# Patient Record
Sex: MALE | Race: WHITE
[De-identification: names, ages, dates, MRNs, and addresses within clinical notes are randomized per-mention and may not be internally consistent; named-entity substitution may affect disease eponyms.]

---
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
## 2018-10-25 NOTE — GHP
DATE OF ADMISSION:  10/25/2018



SOURCE:  Patient is in acute respiratory distress, unable to provide significant amount of history.  
Majority obtained from discussion with ED provider and review of EMR.



CHIEF COMPLAINT:  Shortness of breath.



HISTORY OF PRESENT ILLNESS:  This is a 58-year-old gentleman with past medical history significant fo
r chronic alcohol dependence, HCV, bipolar disorder, PTSD.  Patient presents from the [facility] via EMS for increasing shortness of breath.  The patient was also complaining primarily of left-amada
ed chest pain, worse with inspiration.



REVIEW OF SYSTEMS:  Unable to obtain at time my interview as patient had acute decompensation in resp
iratory status and was increasingly agitated due to alcohol withdrawal.



ALLERGIES:  No known drug allergies.



HOME MEDICATIONS:  As per EMR:  Hydroxyzine, clonazepam, Seroquel, gabapentin.



PAST MEDICAL HISTORY:  Significant for alcohol dependence, PTSD, bipolar disorder, hepatitis C.



PAST SURGICAL HISTORY:  Unable to obtain as patient in acute respiratory distress and being intubated
.



FAMILY HISTORY:  Unable to obtain, as noted above.



SOCIAL HISTORY:  Patient drinks, last being earlier in the day.  Currently homeless, resides at a [facility] shelter.



PHYSICAL EXAMINATION:  VITAL SIGNS:  Upon arrival to the emergency department, blood pressure 130/81,
 heart rate 113, respiratory rate 20, O2 saturation 94% on room air, temperature 36.9. Repeat vital s
igns:  Patient's blood pressure has increased to 179/111.  His heart rate has escalated to the 170s. 
 He had increased respiratory distress and agitation with tremor.  He also developed hypoxia into the
 80s.  The patient was moved over to the procedure room for emergent intubation.  HEAD:  Normocephali
c, atraumatic.  EYES:  Extraocular muscles grossly intact.  Patient unable to follow commands.  Patie
nt with conjunctival injection, no drainage.  Pupils are equal, but with decreased reactivity to ligh
t. Mucous membranes are dry.  No nasal discharge.  CV:  Tachycardic with slightly distant decreased h
eart sounds due to respiratory noises which are coarse in all lung fields.  RESPIRATORY:  Patient wit
h increased work of breathing, retractions.  ABDOMEN:  Soft.  Hypoactive bowel sounds.  Accessory mus
cles in use.  :  Normal external male genitalia.  No scrotal edema.  EXTREMITIES:  Thin, no edema. 
 1+ pedal pulses bilateral, symmetric.  NEURO:  Patient is able to move all extremities, but he is ve
ry tremulous.  Grossly nonfocal.  No facial drooping.  PSYCH:  Patient is increasingly agitated and c
ombative, confused.



LABORATORY STUDIES:  WBC 20.37, H and H are 15.2 and 44.5, MCV 86.2, platelet count 163, absolute ban
ds 1.43. 



PT 13.8, INR is 1.04, PTT is 31.3.  D-dimer 0.72.  Lactic acid 2.4; repeat is 1.9.  Sodium is 134, po
tassium is 2.9, chloride is 94, BUN 5, creatinine is 0.6, glucose is 95, magnesium 0.9, total bilirub
in 0.5, ALT is 44, AST is 57, alkaline phosphatase 86.  Troponin is negative.  __________ .  Total pr
otein 6.4, albumin 3.2.  Alcohol level 38. 



Blood cultures x2 are pending.  



Chest x-ray:  Image was reviewed by me.  Report is still pending.  Large left upper middle lobe pneum
onia. 



EKG:  SVT 150s.  Q-waves in inferior leads, old.  QTc 416. 



Repeat chest x-ray, post intubation:  Tube  appears to be 4 cm above the steven.  Persistent left upp
er middle lobe pneumonia.



ASSESSMENT/PLAN:  A 58-year-old gentleman with history of alcohol dependence, presents with complaint
s of left-sided pleuritic chest pain, dyspnea, hypoxia.  

1.  Acute hypoxic respiratory failure.  Patient with acute decompensation and desaturation down into 
the 80s.  He was subsequently and emergently intubated.  Prior to intubation, patient received vancom
ycin and Zosyn.

2.  Severe sepsis with elevated lactate, which is improved after intravenous fluids.  Antibiotics hav
e been initiated as noted above.  Blood cultures are pending.  Blood pressures are elevated.  Continu
e with intravenous fluid hydration.

3.  Left upper middle lobe pneumonia.  Vancomycin and Zosyn as noted above.

4.  Alcohol withdrawal.  Patient is significantly agitated, confused.  Intubated and will be sedated 
with Ativan and Precedex drip.

5.  Supraventricular tachycardia.  Patient received a bolus of Cardizem in the emergency department a
nd will monitor on telemetry.

6.  Electrolyte abnormalities including hypomagnesemia and hypokalemia.  These will be replaced.  

7.  Hypoalbuminemia secondary to alcohol dependence.

8.  Hyponatremia, likely secondary to hypovolemia.  Intravenous fluids in process.  

9.  Elevated D-dimer.  Patient is acutely, unstable.  At some point, further evaluation with a CT as 
per day team. 

10.  Fluid, electrolyte, nutrition:  Intravenous fluids as noted above.  Electrolytes replacement.  M
onitoring.  Nothing by mouth status.

11.  Prophylaxis with sequential compression devices, Lovenox.

12.  COR status at this time will be full.



DISPOSITION:  Patient admitted to inpatient status to the ICU.  He is intubated and in acute alcohol 
withdrawal in addition to pneumonia with severe sepsis.  Anticipate greater than 2 midnights' stay.





Job #:  [id number]

## 2018-10-25 NOTE — EDPHY
H & P


Source: Patient, EMS


Exam Limitations: Clinical condition, Intoxication





- Medical/Surgical History


Hx Asthma: No


Hx Chronic Respiratory Disease: No


Hx Diabetes: No


Hx Cardiac Disease: No


Hx Renal Disease: No


Hx Cirrhosis: No


Hx Alcoholism: Yes


Hx HIV/AIDS: No


Hx Splenectomy or Spleen Trauma: No


Other PMH: CHI, PTSD, bipolar, ETOH





- Social History


Smoking Status: Current every day smoker


Time Seen by Provider: 10/25/18 04:18


HPI/ROS: 





HPI





CHIEF COMPLAINT:  Left-sided chest discomfort.  Sharp stabbing.





HISTORY OF PRESENT ILLNESS:  This is a 58-year-old male, homeless, history of 

alcoholism daily alcohol use, presents emergency room by EMS with left-sided 

chest discomfort.  Describes the pain as sharp stabbing.  Pain is worse when he 

takes deep breath in.  Of note the patient is very poor historian.  History 

review of systems somewhat limited.  He is unsure exactly when it started.


He presents emergency room, he does drink alcohol daily had alcohol earlier 

this evening.  Of noted is noted that his hands are caked in feces


Patient denies any trauma this evening.  Denies chest wall trauma..





Past Medical History:  Alcoholism, homeless.  Possible history of CVA





Past Surgical History:  No recent surgical history





Social History:  Homeless, daily alcohol use.





Family History:  Noncontributory








ROS   


REVIEW OF SYSTEMS:


Limited due to patient being a poor historian.








Exam   


Constitutional  nontoxic appearing, triage nursing summary reviewed, vital 

signs reviewed, awake/alert. 


Eyes   normal conjunctivae and sclera, EOMI, PERRLA. 


HENT   normal inspection, atraumatic, moist mucus membranes, no epistaxis, neck 

supple/ no meningismus, no raccoon eyes. 


Respiratory   clear to auscultation bilaterally, normal breath sounds, no 

respiratory distress, no wheezing. 


Cardiovascular chest wall mild tender palpation left lateral chest wall,  rate 

normal, regular rhythm, no murmur, no edema, distal pulses normal. 


Gastrointestinal   soft, non-tender, no rebound, no guarding, normal bowel 

sounds, no distension, no pulsatile mass. 


Genitourinary   no CVA tenderness. 


Musculoskeletal  no midline vertebral tenderness, full range of motion, no calf 

swelling, no tenderness of extremities, no meningismus, good pulses, 

neurovascularly intact.


Skin   pink, warm, & dry, no rash, skin atraumatic. 


Neurologic   awake, alert and oriented x 3, AAOx3, moves all 4 extremities 

equally, motor intact, sensory intact, CN II-XII intact, normal cerebellar, 

normal vision, normal speech. 


Psychiatric   normal mood/affect. 


Heme/Lymph/Immune   no lymphadenopathy.





Differential diagnosis includes but is not limited to:  ACS, atypical chest pain

, pneumothorax, pneumonia, pulmonary embolism, aortic dissection, congestive 

heart failure, tumor, musculoskeletal pain, esophageal pain, GERD, peptic ulcer 

disease, pancreatitis





Medical Decision Making:  Plan for this patient IV establishment IV fluid bolus

, obtain EKG, troponin, chest x-ray rule out pneumothorax, rule out PE, rule 

out ACS.





Re-evaluation:








EKG interpretation by me on record in [employer] system.  Impression time of 

EKG 4:19 a.m., this is sinus tach rate of 104, Q-waves noted inferior leads to 

3 AVF I do not appreciate ST elevation.  When I compare this patient's EKG to 

his old EKG dated 8/11/2018 this is very similar morphology.  Him the Q-wave 

complex seen inferior L very similar.





I had Dr. Acevedo evaluate this EKG compared to his old EK G. It is unchanged she 

does not feel that this an acute ST elevation MI. 





Patient's chest x-ray reviewed.  This shows a left-sided dense pneumonia.





Blood culture be obtain.





Lactic acid be obtain.





Broad-spectrum antibiotics will be given IV vancomycin IV Zosyn.





Patient's x-ray reviewed shows a left-sided dense pneumonia.





Broad-spectrum antibiotics have been given.





Hospitalist service as been consult for admission.  Dr. Davidson.











Labs reviewed.  Low potassium low magnesium.  These have been ordered for be 

repleted.


Addition patient's lactic acid was elevated above 2. He is afebrile.


He has received 2 L of fluid.  Repeat lactic pending.





Patient placed on CIWA protocol for alcohol withdrawal.








0723AM:  Patient has a change in his condition.  His heart rate was within the 

110s and is now in the 170s.  The patient's respiratory rate is increased to 

30s to 40s.  He is now satting 83% on a non-rebreather.  He is agitated.  He is 

going to active withdrawal.  IV Ativan has been ordered.


Due to his increase in heart rate the patient have an EKG.





Patient quickly decompensated at 7:15 a.m..  The patient had a rather quick 

decompensation.  His heart rate became very tachycardic in the 180s.  He was 

hypoxic to 80%.  Acutely agitated.  He appeared to be going through significant 

alcohol withdrawal.





Due to the patient's acute agitation, hypoxia, tachycardia, respiratory 

distress with tachypnea the decision was made to intubate the patient 

emergently.





The patient was moved from ER room 12 to ER room 1.





RSI medications were pulled.  20 mg of etomidate.  120 mg of succinylcholine 

was given.





ED intubation 





Direct visualization was obtain with a MAC 4 blade.  A 7.5 endotracheal tube 

was placed directly through the cords.  The tube was confirmed in position with 

good color change on capnography.  Bilateral breath sounds, and chest x-ray.





Patient has been started on a propofol drip for sedation.  Propofol bolus.





Precedex.  For alcohol draw.





Patient's vital signs post intubation current blood pressure 173/49, heart rate 

164. 98% intubated.











Critical Care:  Total Critical Care Time Spent Managing this Patient:  65 

Minutes.


This time was spent Exclusively with this patient.


This Care was exclusive of procedures.


The Organ System/life at risk was alcohol draw, delirium tremens, hypoxia, 

pneumonia, sepsis


 This Patient was in Critical Condition because respiratory failure.





0742:  Hospitalist service as been updated on this patient's condition and 

changing condition.  Patient be admitted to the ICU.  Dr. Davidson aware. 





EKG interpretation by me on record in [employer] system.  Impression time of 

EKG 7:46 a.m., SVT rate of 157. No signs of acute ischemia.  





Chest x-ray reviewed.  Endotracheal tube in appropriate position.  Slightly 

high.  Will be advanced 1 cm.





Dense left pneumonia.  





Update hospitalist service Tammy NP.  Understands patient is intubated.  

Currently patient stable.





Patient's current vital signs heart rate 134, pulse ox 97% intubation, blood 

pressure 106/67. Patient to be admitted to ICU. (Teo Gomes)


Constitutional: 





 Initial Vital Signs











Temperature (C)  36.9 C   10/25/18 04:17


 


Heart Rate  113 H  10/25/18 04:17


 


Respiratory Rate  20   10/25/18 04:17


 


Blood Pressure  130/81 H  10/25/18 04:17


 


O2 Sat (%)  94   10/25/18 04:17








 











O2 Delivery Mode               Room Air


 


O2 (L/minute)                  2














Allergies/Adverse Reactions: 


 





No Known Allergies Allergy (Unverified 10/25/18 04:16)


 








Home Medications: 














 Medication  Instructions  Recorded


 


"Klorazepam"  07/22/16


 


hydrOXYzine HCL [hydrOXYzine HCL 25 mg PO 07/22/16





(RX)]  


 


Gabapentin 600 mg PO 07/23/18


 


QUEtiapine FUMARATE [Seroquel 200 200 mg PO BID #60 tab 08/11/18





mg (*)]  


 


clonazePAM [Klonopin] 2 mg PO BID #60 tablet 08/11/18














Medical Decision Making





- Diagnostics


Imaging Results: 





 Imaging Impressions





Chest X-Ray  10/25/18 04:17


Impression: Bilateral opacities, largest on the left. This may represent 

multifocal pneumonia although recommend follow-up studies after treatment to 

ensure resolution and to exclude underlying malignancy.


 


 











Other Provider: 





The patient remained in the emergency department and our ICU is currently full.

  I discussed this with the ICU nursing director and we feel the patient will 

be best served transferring to a higher level of care where he can receive 

inpatient ICU care.





I contacted Dr. Stafford at [facility] who has accepted the 

patient for admission to the medical ICU.





The patient will be transferred via critical care transport.





I have filled out the EMTALA transfer form.





 (Colby Hallman)





- Data Points


Laboratory Results: 





 Laboratory Results





 10/25/18 04:55 





 











  10/25/18 10/25/18 10/25/18





  05:45 05:45 05:45


 


WBC      





    


 


RBC      





    


 


Hgb      





    


 


POC Hgb      





    


 


Hct      





    


 


POC Hct      





    


 


MCV      





    


 


MCH      





    


 


MCHC      





    


 


RDW      





    


 


Plt Count      





    


 


MPV      





    


 


Neut % (Auto)      





    


 


Lymph % (Auto)      





    


 


Mono % (Auto)      





    


 


Eos % (Auto)      





    


 


Baso % (Auto)      





    


 


Nucleat RBC Rel Count      





    


 


Absolute Neuts (auto)      





    


 


Absolute Lymphs (auto)      





    


 


Absolute Monos (auto)      





    


 


Absolute Eos (auto)      





    


 


Absolute Basos (auto)      





    


 


Absolute Nucleated RBC      





    


 


Immature Gran %      





    


 


Seg Neutrophils %      





    


 


Band Neutrophils %      





    


 


Lymphocytes %      





    


 


Monocytes %      





    


 


Eosinophils %      





    


 


Basophils %      





    


 


Metamyelocytes %      





    


 


Myelocytes %      





    


 


Promyelocytes %      





    


 


Blast Cells %      





    


 


Immature Gran #      





    


 


Absolute Seg Neuts      





    


 


Absolute Band Neuts      





    


 


Absolute Lymphocytes      





    


 


Absolute Monocytes      





    


 


Absolute Eosinophils      





    


 


Absolute Basophils      





    


 


Absolute Metamyelocyte      





    


 


Absolute Myelocytes      





    


 


Absolute Promyelocytes      





    


 


Absolute Plasma Cells      





    


 


RBC/WBC/PLT Morphology      





    


 


Absolute Blast Cells      





    


 


Plasma Cells %      





    


 


Platelet Estimate      





    


 


PT  13.8 SEC SEC    





   (12.0-15.0)   


 


INR  1.04     





   (0.83-1.16)   


 


APTT  31.3 SEC SEC    





   (23.0-38.0)   


 


D-Dimer  0.72 ug/mLFEU H ug/mLFEU    





   (0.00-0.50)   


 


VBG Lactic Acid      2.4 mmol/L H mmol/L





     (0.7-2.1) 


 


POC Sodium      





    


 


POC Potassium      





    


 


POC Chloride      





    


 


POC BUN      





    


 


POC Creatinine      





    


 


POC Glucose      





    


 


Magnesium    0.9 mg/dL L* mg/dL  





    (1.6-2.3)  


 


Total Bilirubin    1.5 mg/dL H mg/dL  





    (0.1-1.4)  


 


Conjugated Bilirubin    0.5 mg/dL mg/dL  





    (0.0-0.5)  


 


Unconjugated Bilirubin    1.0 mg/dL mg/dL  





    (0.0-1.1)  


 


AST    57 IU/L IU/L  





    (17-59)  


 


ALT    44 IU/L IU/L  





    (21-72)  


 


Alkaline Phosphatase    86 IU/L IU/L  





    ()  


 


POC Troponin I      





    


 


NT-Pro-B Natriuret Pep    1720 pg/mL H pg/mL  





    (0-125)  


 


Total Protein    6.4 g/dL g/dL  





    (6.3-8.2)  


 


Albumin    3.2 g/dL L g/dL  





    (3.5-5.0)  


 


Lipase    33 IU/L IU/L  





    ()  


 


Ethyl Alcohol    38 mg/dL H mg/dL  





    (0-10)  














  10/25/18 10/25/18 10/25/18





  05:35 05:07 05:00


 


WBC      





    


 


RBC      





    


 


Hgb      





    


 


POC Hgb  15.3 gm/dL gm/dL    





   (13.7-17.5)   


 


Hct      





    


 


POC Hct  45 % %    





   (40-51)   


 


MCV      





    


 


MCH      





    


 


MCHC      





    


 


RDW      





    


 


Plt Count      





    


 


MPV      





    


 


Neut % (Auto)      





    


 


Lymph % (Auto)      





    


 


Mono % (Auto)      





    


 


Eos % (Auto)      





    


 


Baso % (Auto)      





    


 


Nucleat RBC Rel Count      





    


 


Absolute Neuts (auto)      





    


 


Absolute Lymphs (auto)      





    


 


Absolute Monos (auto)      





    


 


Absolute Eos (auto)      





    


 


Absolute Basos (auto)      





    


 


Absolute Nucleated RBC      





    


 


Immature Gran %      





    


 


Seg Neutrophils %      





    


 


Band Neutrophils %      





    


 


Lymphocytes %      





    


 


Monocytes %      





    


 


Eosinophils %      





    


 


Basophils %      





    


 


Metamyelocytes %      





    


 


Myelocytes %      





    


 


Promyelocytes %      





    


 


Blast Cells %      





    


 


Immature Gran #      





    


 


Absolute Seg Neuts      





    


 


Absolute Band Neuts      





    


 


Absolute Lymphocytes      





    


 


Absolute Monocytes      





    


 


Absolute Eosinophils      





    


 


Absolute Basophils      





    


 


Absolute Metamyelocyte      





    


 


Absolute Myelocytes      





    


 


Absolute Promyelocytes      





    


 


Absolute Plasma Cells      





    


 


RBC/WBC/PLT Morphology      





    


 


Absolute Blast Cells      





    


 


Plasma Cells %      





    


 


Platelet Estimate      





    


 


PT      REJ 





    


 


INR      REJ 





    


 


APTT      REJ 





    


 


D-Dimer      REJ 





    


 


VBG Lactic Acid      





    


 


POC Sodium  134 mEq/L L mEq/L    





   (135-145)   


 


POC Potassium  2.9 mEq/L L mEq/L    





   (3.3-5.0)   


 


POC Chloride  94 mEq/L L mEq/L    





   ()   


 


POC BUN  5 mg/dL L mg/dL    





   (7-23)   


 


POC Creatinine  0.6 mg/dL L mg/dL    





   (0.7-1.3)   


 


POC Glucose  93 mg/dL mg/dL    





   ()   


 


Magnesium      





    


 


Total Bilirubin      





    


 


Conjugated Bilirubin      





    


 


Unconjugated Bilirubin      





    


 


AST      





    


 


ALT      





    


 


Alkaline Phosphatase      





    


 


POC Troponin I    0.05 ng/mL ng/mL  





    (0.00-0.08)  


 


NT-Pro-B Natriuret Pep      





    


 


Total Protein      





    


 


Albumin      





    


 


Lipase      





    


 


Ethyl Alcohol      





    














  10/25/18 10/25/18





  04:55 04:55


 


WBC    20.37 10^3/uL H 10^3/uL





    (3.80-9.50) 


 


RBC    5.16 10^6/uL 10^6/uL





    (4.40-6.38) 


 


Hgb    15.2 g/dL g/dL





    (13.7-17.5) 


 


POC Hgb    





   


 


Hct    44.5 % %





    (40.0-51.0) 


 


POC Hct    





   


 


MCV    86.2 fL fL





    (81.5-99.8) 


 


MCH    29.5 pg pg





    (27.9-34.1) 


 


MCHC    34.2 g/dL g/dL





    (32.4-36.7) 


 


RDW    13.1 % %





    (11.5-15.2) 


 


Plt Count    163 10^3/uL 10^3/uL





    (150-400) 


 


MPV    10.2 fL fL





    (8.7-11.7) 


 


Neut % (Auto)    Not Reported 





   


 


Lymph % (Auto)    Not Reported 





   


 


Mono % (Auto)    Not Reported 





   


 


Eos % (Auto)    Not Reported 





   


 


Baso % (Auto)    Not Reported 





   


 


Nucleat RBC Rel Count    Not Reported 





   


 


Absolute Neuts (auto)    Not Reported 





   


 


Absolute Lymphs (auto)    Not Reported 





   


 


Absolute Monos (auto)    Not Reported 





   


 


Absolute Eos (auto)    Not Reported 





   


 


Absolute Basos (auto)    Not Reported 





   


 


Absolute Nucleated RBC    Not Reported 





   


 


Immature Gran %    Not Reported 





   


 


Seg Neutrophils %    76.0 % %





   


 


Band Neutrophils %    7.0 % %





   


 


Lymphocytes %    6.0 % %





   


 


Monocytes %    11.0 % %





   


 


Eosinophils %    0.0 % %





   


 


Basophils %    0.0 % %





   


 


Metamyelocytes %    0.0 % %





   


 


Myelocytes %    0.0 % %





   


 


Promyelocytes %    0.0 % %





   


 


Blast Cells %    0.0 % %





   


 


Immature Gran #    Not Reported 





   


 


Absolute Seg Neuts    15.48 10^3/uL H 10^3/uL





    (1.70-6.50) 


 


Absolute Band Neuts    1.43 10^3/uL H 10^3/uL





    (0.00-0.70) 


 


Absolute Lymphocytes    1.22 10^3/uL 10^3/uL





    (1.00-3.00) 


 


Absolute Monocytes    2.24 10^3/uL H 10^3/uL





    (0.30-0.80) 


 


Absolute Eosinophils    0.00 10^3/uL L 10^3/uL





    (0.03-0.40) 


 


Absolute Basophils    0.00 10^3/uL L 10^3/uL





    (0.02-0.10) 


 


Absolute Metamyelocyte    0.00 10^3/mL 10^3/mL





    (0.00-0.00) 


 


Absolute Myelocytes    0.00 10^3/mL 10^3/mL





    (0.00-0.00) 


 


Absolute Promyelocytes    0.00 10^3/uL 10^3/uL





    (0.00-0.00) 


 


Absolute Plasma Cells    0.00 10^3/uL 10^3/uL





    (0.00-0.00) 


 


RBC/WBC/PLT Morphology    NORMAL 





    (NORMAL) 


 


Absolute Blast Cells    0.00 10^3/uL 10^3/uL





    (0.00-0.00) 


 


Plasma Cells %    0.0 % %





   


 


Platelet Estimate    ADEQUATE 





    (ADEQ) 


 


PT    





   


 


INR    





   


 


APTT    





   


 


D-Dimer    





   


 


VBG Lactic Acid    





   


 


POC Sodium    





   


 


POC Potassium    





   


 


POC Chloride    





   


 


POC BUN    





   


 


POC Creatinine    





   


 


POC Glucose    





   


 


Magnesium  TNP   





   


 


Total Bilirubin  TNP   





   


 


Conjugated Bilirubin  TNP   





   


 


Unconjugated Bilirubin  TNP   





   


 


AST  TNP   





   


 


ALT  TNP   





   


 


Alkaline Phosphatase  TNP   





   


 


POC Troponin I    





   


 


NT-Pro-B Natriuret Pep  TNP   





   


 


Total Protein  TNP   





   


 


Albumin  TNP   





   


 


Lipase  TNP   





   


 


Ethyl Alcohol  TNP   





   











Medications Given: 





 





Acetaminophen (Tylenol Rectal)  650 mg UT Q4HRS PRN


   PRN Reason: Pain, Mild/Fever,Can't Take PO


   Stop: 04/23/19 07:55


   Last Admin: 10/25/18 07:59 Dose:  650 mg


Propofol (Diprivan 10 Mg/Ml (Premix))  100 mls @ 0 mls/hr IV CONT LUZ MARIA; Titrate


   PRN Reason: Protocol


   Stop: 04/23/19 07:59


   Last Admin: 10/25/18 07:40 Dose:  100 mls





Discontinued Medications





Clonazepam (Klonopin)  1 mg PO EDNOW ONE


   Stop: 10/25/18 06:43


   Last Admin: 10/25/18 06:47 Dose:  1 mg


Etomidate (Etomidate)  20 mg IVP ONCE ONE


   Stop: 10/25/18 07:37


   Last Admin: 10/25/18 07:38 Dose:  20 mg


Sodium Chloride (Ns)  1,000 mls @ 0 mls/hr IV EDNOW ONE; Wide Open


   PRN Reason: Protocol


   Stop: 10/25/18 04:18


   Last Admin: 10/25/18 04:24 Dose:  1,000 mls


Vancomycin/Sodium Chloride (Vancomycin 1 Gm (Premix))  250 mls @ 250 mls/hr IV 

EDNOW ONE


   PRN Reason: Protocol


   Stop: 10/25/18 06:11


   Last Admin: 10/25/18 05:47 Dose:  250 mls


Piperacillin/Tazobactam/Dextrose (Zosyn (Premix))  100 mls @ 200 mls/hr IV 

EDNOW ONE


   PRN Reason: Protocol


   Stop: 10/25/18 05:41


   Last Admin: 10/25/18 06:52 Dose:  100 mls


Sodium Chloride (Ns)  1,000 mls @ 0 mls/hr IV ONCE ONE


   PRN Reason: Wide Open


   Stop: 10/25/18 05:16


   Last Admin: 10/25/18 05:47 Dose:  1,000 mls


Sodium Chloride (Ns)  1,000 mls @ 0 mls/hr IV EDNOW ONE; Wide Open


   PRN Reason: Protocol


   Stop: 10/25/18 07:43


   Last Admin: 10/25/18 07:58 Dose:  1,000 mls


Lorazepam (Ativan Injection)  1 mg IVP ONCE ONE


   Stop: 10/25/18 07:14


   Last Admin: 10/25/18 07:42 Dose:  Not Given


Lorazepam (Ativan Injection)  2 mg IVP EDNOW ONE


   Stop: 10/25/18 07:39


   Last Admin: 10/25/18 07:39 Dose:  2 mg


Lorazepam (Ativan Injection)  2 mg IVP EDNOW ONE


   Stop: 10/25/18 07:40


   Last Admin: 10/25/18 07:42 Dose:  2 mg


Magnesium Oxide (Magnesium Oxide)  800 mg PO ONCE ONE


   Stop: 10/25/18 06:42


   Last Admin: 10/25/18 07:07 Dose:  800 mg


Potassium Chloride (Klor Packets)  20 meq PO EDNOW ONE


   Stop: 10/25/18 05:46


   Last Admin: 10/25/18 06:20 Dose:  20 meq


Propofol (Diprivan)  60 mg IVP EDNOW ONE


   Stop: 10/25/18 07:38


   Last Admin: 10/25/18 07:40 Dose:  60 mg


Quetiapine Fumarate (Seroquel)  400 mg PO EDNOW ONE


   Stop: 10/25/18 06:42


   Last Admin: 10/25/18 06:47 Dose:  400 mg


Succinylcholine Chloride (Quelicin)  120 mg IVP ONCE ONE


   Stop: 10/25/18 07:38


   Last Admin: 10/25/18 07:38 Dose:  120 mg





Point of Care Test Results: 





 Chemistry











  10/25/18 10/25/18





  05:35 05:07


 


POC Sodium  134 mEq/L L mEq/L  





   (135-145)  


 


POC Potassium  2.9 mEq/L L mEq/L  





   (3.3-5.0)  


 


POC Chloride  94 mEq/L L mEq/L  





   ()  


 


POC BUN  5 mg/dL L mg/dL  





   (7-23)  


 


POC Creatinine  0.6 mg/dL L mg/dL  





   (0.7-1.3)  


 


POC Glucose  93 mg/dL mg/dL  





   ()  


 


POC Troponin I    0.05 ng/mL ng/mL





    (0.00-0.08) 








 ISTAT H&H











  10/25/18





  05:35


 


POC Hgb  15.3 gm/dL gm/dL





   (13.7-17.5) 


 


POC Hct  45 % %





   (40-51) 














Departure





- Departure


Disposition: Acute Care Hospital Not [facility]


Clinical Impression: 


 Hypomagnesemia, Hypokalemia, Dehydration, Delirium tremens





Pneumonia


Qualifiers:


 Pneumonia type: aspiration pneumonia Aspiration pneumonia type: unspecified 

Laterality: left Lung location: upper lobe of lung Qualified Code(s): J69.0 - 

Pneumonitis due to inhalation of food and vomit





Respiratory failure


Qualifiers:


 Chronicity: acute Respiratory failure complication: hypoxia Qualified Code(s): 

J96.01 - Acute respiratory failure with hypoxia





Alcohol withdrawal


Qualifiers:


 Complication of substance-induced condition: uncomplicated Qualified Code(s): 

F10.230 - Alcohol dependence with withdrawal, uncomplicated





Condition: Critical

## 2018-10-29 NOTE — EDPHY
H & P


Stated Complaint: ETOH


Time Seen by Provider: 10/29/18 22:22


HPI/ROS: 





HPI


The patient presents with alcohol intoxication, brought in by ambulance.  

Patient was picked up earlier in the night by police for alcohol intoxication.  

They brought him to the ER, however he is not welcome there any more.  He then 

left the arc and walked into a ditch where he was found by bystanders.  

Paramedics picked him up outside on the sidewalk curled in a fetal position.  

The patient denies any falls or injury.  He did have an episode of fecal 

incontinence.  He says he has been drinking heavily today.





Of note, the patient presented to this emergency department 5 days ago and was 

diagnosed with severe sepsis, pneumonia, alcohol withdrawal requiring 

intubation and ICU level of care.  He was transferred to [facility] because 

of bed availability.  He says he is still taking antibiotics for a pneumonia.





REVIEW OF SYSTEMS


10 systems were reviewed and negative with the exception of the elements 

mentioned in the history of present illness.





PMHx:  Recent hypoxic respiratory failure related to sepsis and pneumonia, 

chronic alcohol abuse, PTSD, hepatitis





Soc Hx:  Homeless, chronic alcohol abuse





PHYSICAL


General Appearance: Alert, no distress


Eyes: Pupils equal and round no pallor or injection


ENT, Mouth: Mucous membranes moist


Respiratory: There are no retractions, lungs are clear to auscultation


Cardiovascular:  Regular rate and rhythm 


Gastrointestinal:  Abdomen is soft and non-tender, no masses, bowel sounds 

normal 


Neurological:  A&O, moves all extremities


Skin:  Warm and dry, no rashes


Musculoskeletal: Neck is supple non tender 


Extremities:  symmetrical, full range of motion 


Psychiatric:  Patient is oriented X 3, there is no agitation 





Source: Patient, EMS, Old records


Exam Limitations: Intoxication





- Personal History


Current Tetanus/Diphtheria Vaccine: Unsure


Current Tetanus Diphtheria and Acellular Pertussis (TDAP): Unsure





- Medical/Surgical History


Hx Asthma: No


Hx Chronic Respiratory Disease: No


Hx Diabetes: No


Hx Cardiac Disease: No


Hx Renal Disease: No


Hx Cirrhosis: No


Hx Alcoholism: Yes


Hx HIV/AIDS: No


Hx Splenectomy or Spleen Trauma: No


Other PMH: CHI, PTSD, bipolar, ETOH, TBI





- Social History


Smoking Status: Current every day smoker


Constitutional: 


 Initial Vital Signs











Temperature (C)  36.5 C   10/29/18 22:02


 


Heart Rate  81   10/29/18 22:02


 


Respiratory Rate  16   10/29/18 22:02


 


Blood Pressure  105/71   10/29/18 22:02


 


O2 Sat (%)  83 L  10/29/18 22:02








 











O2 Delivery Mode               Room Air


 


O2 (L/minute)                  2














Allergies/Adverse Reactions: 


 





No Known Allergies Allergy (Unverified 10/25/18 04:16)


 








Home Medications: 














 Medication  Instructions  Recorded


 


"Klorazepam"  07/22/16


 


hydrOXYzine HCL [hydrOXYzine HCL 25 mg PO 07/22/16





(RX)]  


 


Gabapentin 600 mg PO 07/23/18


 


QUEtiapine FUMARATE [Seroquel 200 200 mg PO BID #60 tab 08/11/18





mg (*)]  


 


clonazePAM [Klonopin] 2 mg PO BID #60 tablet 08/11/18


 


levOFLOXACIN [levAQUIN (*)] 750 mg PO DAILY #10 tab 10/30/18














Medical Decision Making





- Diagnostics


Imaging Results: 


 Imaging Impressions





Chest X-Ray  10/29/18 22:09


Impression: Less dense left upper lobe pulmonary consolidation, and near-

complete interval clearing of mild airspace disease previously noted at the 

medial right lung base, compared to 10/25/2018.


 


Recommendation: Continued clinical correlation and follow-up to assure complete 

resolution.











Procedures: 





IV line placement-





Using direct ultrasound guidance, I placed a left-sided IJ using an 18 gauge 

Angiocath.  Patient tolerated the procedure well with no immediate complication.


Differential Diagnosis: 





58-year-old male with history of homelessness, alcohol abuse, recent episode of 

pneumonia complicated by sepsis and alcohol withdrawal requiring ICU stay in 

transfer to [facility].  He now is here with alcohol intoxication, rejected 

from the arc, unable to ambulate because of alcohol intoxication.  Here, he is 

hypoxic at about 82% on room air.  He denies any complaints.





Chest x-ray was performed which showed persistent though improving left-sided 

pneumonia.  He was given a dose of ceftriaxone and azithromycin for this.  Labs 

were otherwise unremarkable except for elevated blood alcohol level.  He was 

observed in the emergency department.  His oxygen saturation improved without 

any interventions to 92-96% on room air.





He will be discharged.  We have prescribed him as Levaquin and given him actual 

pills to go home with.





Differential diagnosis includes alcohol intoxication, polysubstance abuse, 

pneumonia.





- Data Points


Laboratory Results: 


 Laboratory Results





 10/29/18 23:20 





 10/29/18 23:20 





 











  10/29/18 10/29/18 10/29/18





  23:20 23:20 23:20


 


WBC      12.13 10^3/uL H 10^3/uL





     (3.80-9.50) 


 


RBC      3.98 10^6/uL L 10^6/uL





     (4.40-6.38) 


 


Hgb      11.5 g/dL L g/dL





     (13.7-17.5) 


 


Hct      34.7 % L %





     (40.0-51.0) 


 


MCV      87.2 fL fL





     (81.5-99.8) 


 


MCH      28.9 pg pg





     (27.9-34.1) 


 


MCHC      33.1 g/dL g/dL





     (32.4-36.7) 


 


RDW      13.6 % %





     (11.5-15.2) 


 


Plt Count      355 10^3/uL 10^3/uL





     (150-400) 


 


MPV      8.8 fL fL





     (8.7-11.7) 


 


Neut % (Auto)      Not Reported 





    


 


Lymph % (Auto)      Not Reported 





    


 


Mono % (Auto)      Not Reported 





    


 


Eos % (Auto)      Not Reported 





    


 


Baso % (Auto)      Not Reported 





    


 


Nucleat RBC Rel Count      Not Reported 





    


 


Absolute Neuts (auto)      Not Reported 





    


 


Absolute Lymphs (auto)      Not Reported 





    


 


Absolute Monos (auto)      Not Reported 





    


 


Absolute Eos (auto)      Not Reported 





    


 


Absolute Basos (auto)      Not Reported 





    


 


Absolute Nucleated RBC      Not Reported 





    


 


Immature Gran %      Not Reported 





    


 


Seg Neutrophils %      72.5 % %





    


 


Band Neutrophils %      0.0 % %





    


 


Lymphocytes %      16.3 % %





    


 


Monocytes %      8.2 % %





    


 


Eosinophils %      1.0 % %





    


 


Basophils %      2.0 % %





    


 


Metamyelocytes %      0.0 % %





    


 


Myelocytes %      0.0 % %





    


 


Promyelocytes %      0.0 % %





    


 


Blast Cells %      0.0 % %





    


 


Immature Gran #      Not Reported 





    


 


Absolute Seg Neuts      8.79 10^3/uL H 10^3/uL





     (1.70-6.50) 


 


Absolute Band Neuts      0.00 10^3/uL 10^3/uL





     (0.00-0.70) 


 


Absolute Lymphocytes      1.98 10^3/uL 10^3/uL





     (1.00-3.00) 


 


Absolute Monocytes      0.99 10^3/uL H 10^3/uL





     (0.30-0.80) 


 


Absolute Eosinophils      0.12 10^3/uL 10^3/uL





     (0.03-0.40) 


 


Absolute Basophils      0.24 10^3/uL H 10^3/uL





     (0.02-0.10) 


 


Absolute Metamyelocyte      0.00 10^3/mL 10^3/mL





     (0.00-0.00) 


 


Absolute Myelocytes      0.00 10^3/mL 10^3/mL





     (0.00-0.00) 


 


Absolute Promyelocytes      0.00 10^3/uL 10^3/uL





     (0.00-0.00) 


 


Absolute Plasma Cells      0.00 10^3/uL 10^3/uL





     (0.00-0.00) 


 


Nucleated RBCs      0 /100 WBC /100 WBC





     (0-0) 


 


Absolute Blast Cells      0.00 10^3/uL 10^3/uL





     (0.00-0.00) 


 


Plasma Cells %      0.0 % %





    


 


Platelet Estimate      ADEQUATE 





     (ADEQ) 


 


Hypochromasia      1+  H 





    


 


Sodium    138 mEq/L mEq/L  





    (135-145)  


 


Potassium    3.8 mEq/L mEq/L  





    (3.3-5.0)  


 


Chloride    101 mEq/L mEq/L  





    ()  


 


Carbon Dioxide    25 mEq/l mEq/l  





    (22-31)  


 


Anion Gap    12 mEq/L mEq/L  





    (6-14)  


 


BUN    7 mg/dL mg/dL  





    (7-23)  


 


Creatinine    0.5 mg/dL L mg/dL  





    (0.7-1.3)  


 


Estimated GFR    > 60   





    


 


Glucose    98 mg/dL mg/dL  





    ()  


 


Calcium    8.1 mg/dL L mg/dL  





    (8.5-10.4)  


 


Total Bilirubin    0.3 mg/dL mg/dL  





    (0.1-1.4)  


 


AST    39 IU/L IU/L  





    (17-59)  


 


ALT    44 IU/L IU/L  





    (21-72)  


 


Alkaline Phosphatase    82 IU/L IU/L  





    ()  


 


Total Protein    6.3 g/dL g/dL  





    (6.3-8.2)  


 


Albumin    2.9 g/dL L g/dL  





    (3.5-5.0)  


 


Ethyl Alcohol  245 mg/dL H mg/dL    





   (0-10)   











Medications Given: 


 








Discontinued Medications





Azithromycin (Zithromax)  500 mg PO EDNOW ONE


   PRN Reason: Protocol


   Stop: 10/29/18 23:21


   Last Admin: 10/29/18 23:27 Dose:  500 mg


Ceftriaxone Sodium/Dextrose (Rocephin 1 Gm (Premix))  50 mls @ 100 mls/hr IV 

EDNOW ONE


   PRN Reason: Protocol


   Stop: 10/29/18 23:49


   Last Admin: 10/29/18 23:28 Dose:  50 mls








Departure





- Departure


Disposition: Home, Routine, Self-Care


Clinical Impression: 


 Pneumonia, Alcoholic intoxication, Homeless





Condition: Good


Instructions:  Pneumonia (ED)


Referrals: 


[facility],. [Clinic] - As per Instructions


Prescriptions: 


levOFLOXACIN [levAQUIN (*)] 750 mg PO DAILY #10 tab

## 2018-10-30 NOTE — CPEKG
Test Reason : OPEN

Blood Pressure : ***/*** mmHG

Vent. Rate : 087 BPM     Atrial Rate : 088 BPM

   P-R Int : 120 ms          QRS Dur : 116 ms

    QT Int : 396 ms       P-R-T Axes : 067 -76 039 degrees

   QTc Int : 477 ms

 

Sinus rhythm

Atrial premature complexes

Left anterior fascicular block

 

Confirmed by Ludivina Mcdonald ([id number]) on 10/30/2018 4:09:56 AM

 

Referred By:             Confirmed By:Ludivina Mcdonald

## 2018-11-01 NOTE — CPEKG
Test Reason : OPEN

Blood Pressure : ***/*** mmHG

Vent. Rate : 157 BPM     Atrial Rate : 163 BPM

   P-R Int : 110 ms          QRS Dur : 103 ms

    QT Int : 257 ms       P-R-T Axes : 080 -86 000 degrees

   QTc Int : 416 ms

 

Supraventricular tachycardia

Abnormal R-wave progression, early transition

Inferior infarct, old

Lateral leads are also involved

 

Confirmed by Teo Gomes ([id number]) on 11/1/2018 6:05:32 AM

 

Referred By:             Confirmed By:Teo Gomes

## 2018-11-01 NOTE — CPEKG
Test Reason : OPEN

Blood Pressure : ***/*** mmHG

Vent. Rate : 104 BPM     Atrial Rate : 106 BPM

   P-R Int : 124 ms          QRS Dur : 120 ms

    QT Int : 357 ms       P-R-T Axes : 072 -77 067 degrees

   QTc Int : 470 ms

 

Sinus tachycardia

Multiform ventricular premature complexes

RBBB and LAFB

ST elevation, consider inferior injury

 

Confirmed by Teo Gomes ([id number]) on 11/1/2018 6:05:32 AM

 

Referred By:             Confirmed By:Teo Gomes

## 2018-11-28 NOTE — ASMTCMCOM
CM Note

 

CM Note                       

Notes:

Patient is well known to the ED and has presented here 13 times since July of this year. Patient 

tells this CM that he has a court appearance in [place] on Friday and plans to stay at [facility] for the next 2 nights.  He does take Suboxone and does have this with him, although 


he admits to using Heroin as recently as last night.  His Suboxone is prescribed at [facility].



This CM contacted Katya at [facility] [phone] and confirmed that patient is 

on the permanent "do not admit" list.  Patient confirms this to be true as well.  When 

asked, patient states that he plans to go to the police station to talk to an "officer" about his 

court situation before heading to [facility] for the night.  I offered to get patient to [facility] ([facility]) but patient declines.

 

Date Signed:  11/28/2018 12:34 PM

Electronically Signed By:Francesca Perez RN

## 2018-11-28 NOTE — EDPHY
H & P


Time Seen by Provider: 11/28/18 09:53


HPI/ROS: 





CHIEF COMPLAINT:  Withdrawal seizure





HISTORY OF PRESENT ILLNESS:  The patient is a 58-year-old homeless alcoholic 

man who will also has an opiate addiction.  He is on Suboxone for the last 8 

months.  He reports using heroin last night.  Today he was witnessed at the 

market having a seizure.  He states that his seizures previously of always been 

in the setting of alcohol withdrawal.  He has last drink of alcohol was about 

24 hr ago.  He was not incontinent.  No oral trauma.  No head trauma.  He is 

now awake and alert.  He primarily complains of being thirsty and dehydrated.


Severity:  Moderate


Modifying factors:  None





REVIEW OF SYSTEMS:


Constitutional:  denies: chills, fever, recent illness, recent injury


EENTM:  See HPI denies: blurred vision, double vision, nose congestion


Respiratory: denies: cough, shortness of breath


Cardiac: denies: chest pain, irregular heart rate, lightheadedness, palpitations


Gastrointestinal/Abdominal: denies: abdominal pain, diarrhea, nausea, vomiting, 

blood streaked stools


Genitourinary: denies: dysuria, frequency, hematuria, pain


Musculoskeletal: denies: joint pain, muscle pain


Skin: denies: lesions, rash, jaundice, bruising


Neurological:  See HPI denies: headache, numbness, paresthesia, tingling, 

dizziness, weakness


Hematologic/Lymphatic: denies: blood clots, easy bleeding, easy bruising


Immunologic/allergic: denies: HIV/AIDS, transplant


 10 systems reviewed and negative except as noted





EXAM:


GENERAL:  Disheveled, slightly tachycardic, thin


HEAD:  Atraumatic, normocephalic.


EYES:  Pupils equal round and reactive to light, extraocular movements intact, 

sclera anicteric, conjunctiva are normal.


ENT:  No oral trauma, TMs normal, nares patent, oropharynx clear without 

exudates.  Dry mucous membranes.


NECK:  Normal range of motion, supple without lymphadenopathy or JVD.


LUNGS:  Breath sounds clear to auscultation bilaterally and equal.  No wheezes 

rales or rhonchi.


HEART:  Regular rate and rhythm without murmurs, rubs or gallops.


ABDOMEN:  Soft, nontender, normoactive bowel sounds.  No guarding, no rebound.  

No masses appreciated. 


BACK:  No CVA tenderness, no spinal tenderness, step-offs or deformities


EXTREMITIES:  Mild tremors, Normal range of motion, no pitting or edema.  No 

clubbing or cyanosis.


NEUROLOGICAL:  Cranial nerves II through XII grossly intact.  Normal speech, 

normal gait.  5/5 strength, normal movement in all extremities, normal sensation

, normal reflexes


PSYCH:  Normal mood, normal affect.


SKIN:  Warm, dry, normal turgor, no visible rashes or lesions.








Source: Patient, EMS


Exam Limitations: Clinical condition





- Medical/Surgical History


Hx Asthma: No


Hx Chronic Respiratory Disease: No


Hx Diabetes: No


Hx Cardiac Disease: No


Hx Renal Disease: No


Hx Cirrhosis: No


Hx Alcoholism: Yes


Hx HIV/AIDS: No


Hx Splenectomy or Spleen Trauma: No


Other PMH: CHI, PTSD, bipolar, ETOH, TBI





- Family History


Significant Family History: No pertinent family hx





- Social History


Smoking Status: Current every day smoker


Alcohol Use: Heavy


Drug Use: Heroin


Constitutional: 


 Initial Vital Signs











Temperature (C)  37 C   11/28/18 10:05


 


Heart Rate  105 H  11/28/18 10:05


 


Respiratory Rate  16   11/28/18 10:05


 


Blood Pressure  107/68   11/28/18 10:05


 


O2 Sat (%)  96   11/28/18 10:05








 











O2 Delivery Mode               Room Air














Allergies/Adverse Reactions: 


 





No Known Allergies Allergy (Unverified 10/25/18 04:16)


 








Home Medications: 














 Medication  Instructions  Recorded


 


"Klorazepam"  07/22/16


 


hydrOXYzine HCL [hydrOXYzine HCL 25 mg PO 07/22/16





(RX)]  


 


Gabapentin 600 mg PO 07/23/18


 


QUEtiapine FUMARATE [Seroquel 200 200 mg PO BID #60 tab 08/11/18





mg (*)]  


 


clonazePAM [Klonopin] 2 mg PO BID #60 tablet 08/11/18


 


levOFLOXACIN [levAQUIN (*)] 750 mg PO DAILY #10 tab 10/30/18














Medical Decision Making


ED Course/Re-evaluation: 





After several IV attempts the patient refused IV and was plain to leave AMA.  

We convinced him to stay and receive oral Ativan.  He is doing well with this 

in his heart rate is down to 92.  He is less tremulous.  We initially planned 

[facility] however he has been from there.  Patient 

promises that he will not drink alcohol.  He states that he has a court date 

tomorrow.  I will send him out with a take-home Librium.


Differential Diagnosis: 





Partial list of the Differential diagnosis considered include but were not 

limited to;  alcohol withdrawal, polysubstance abuse and although unlikely 

based on the history and physical exam, I also considered head injury, 

infection. 





- Data Points


Laboratory Results: 


 Laboratory Results





 11/28/18 09:45 





 11/28/18 09:45 








Medications Given: 


 








Discontinued Medications





Chlordiazepoxide (Librium 25 Mg Prepack#6)  1 btl TAKEHOME EDNOW ONE


   Stop: 11/28/18 11:39


   Last Admin: 11/28/18 12:00 Dose:  1 btl


Sodium Chloride (Ns)  1,000 mls @ 0 mls/hr IV EDNOW ONE; Wide Open


   PRN Reason: Protocol


   Stop: 11/28/18 10:00


   Last Admin: 11/28/18 10:25 Dose:  Not Given


Lorazepam (Ativan)  0 mg PO Q4H PRN; Protocol


   PRN Reason: Alcohol Withdrawal w/IV access


   Stop: 11/28/18 21:59


   Last Admin: 11/28/18 10:24 Dose:  1 mg








Departure





- Departure


Disposition: Home, Routine, Self-Care


Clinical Impression: 


 Seizure





Alcohol withdrawal


Qualifiers:


 Complication of substance-induced condition: uncomplicated Qualified Code(s): 

F10.230 - Alcohol dependence with withdrawal, uncomplicated





Condition: Fair


Instructions:  Chlordiazepoxide (By mouth), Alcohol Withdrawal (ED)


Referrals: 


Patient,NotPresent [Unknown] - As per Instructions


[facility],. [Clinic] - As per Instructions

## 2018-12-09 ENCOUNTER — HOSPITAL ENCOUNTER (EMERGENCY)
Dept: HOSPITAL 80 - FED | Age: 58
Discharge: HOME | End: 2018-12-09
Payer: MEDICAID

## 2018-12-09 VITALS — SYSTOLIC BLOOD PRESSURE: 126 MMHG | DIASTOLIC BLOOD PRESSURE: 84 MMHG

## 2018-12-09 DIAGNOSIS — Z59.0: ICD-10-CM

## 2018-12-09 DIAGNOSIS — S09.90XA: Primary | ICD-10-CM

## 2018-12-09 DIAGNOSIS — F10.920: ICD-10-CM

## 2018-12-09 SDOH — ECONOMIC STABILITY - HOUSING INSECURITY: HOMELESSNESS: Z59.0
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
## 2018-12-10 NOTE — ASMTCAGE
CAGE

 

Additional Comments           Patient still heavily intoxicated

 

Date Signed:  12/10/2018 04:26 PM

Electronically Signed By:Debbi Aburto RN
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
## 2018-12-12 ENCOUNTER — HOSPITAL ENCOUNTER (EMERGENCY)
Dept: HOSPITAL 80 - FED | Age: 58
Discharge: HOME | End: 2018-12-12
Payer: MEDICAID

## 2018-12-12 VITALS — SYSTOLIC BLOOD PRESSURE: 124 MMHG | DIASTOLIC BLOOD PRESSURE: 94 MMHG

## 2018-12-12 DIAGNOSIS — F17.200: ICD-10-CM

## 2018-12-12 DIAGNOSIS — Z87.820: ICD-10-CM

## 2018-12-12 DIAGNOSIS — F10.20: Primary | ICD-10-CM

## 2018-12-12 DIAGNOSIS — F43.10: ICD-10-CM

## 2018-12-12 DIAGNOSIS — F31.9: ICD-10-CM

## 2018-12-12 DIAGNOSIS — Z59.0: ICD-10-CM

## 2018-12-12 SDOH — ECONOMIC STABILITY - HOUSING INSECURITY: HOMELESSNESS: Z59.0
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
## 2018-12-17 ENCOUNTER — HOSPITAL ENCOUNTER (INPATIENT)
Dept: HOSPITAL 80 - FED | Age: 58
LOS: 18 days | Discharge: HOME | DRG: 710 | End: 2019-01-04
Attending: FAMILY MEDICINE | Admitting: FAMILY MEDICINE
Payer: MEDICAID

## 2018-12-17 DIAGNOSIS — F10.20: ICD-10-CM

## 2018-12-17 DIAGNOSIS — Z87.01: ICD-10-CM

## 2018-12-17 DIAGNOSIS — Z59.0: ICD-10-CM

## 2018-12-17 DIAGNOSIS — K80.43: ICD-10-CM

## 2018-12-17 DIAGNOSIS — K31.84: ICD-10-CM

## 2018-12-17 DIAGNOSIS — F11.20: ICD-10-CM

## 2018-12-17 DIAGNOSIS — A41.81: Primary | ICD-10-CM

## 2018-12-17 DIAGNOSIS — R65.20: ICD-10-CM

## 2018-12-17 DIAGNOSIS — I38: ICD-10-CM

## 2018-12-17 DIAGNOSIS — F31.9: ICD-10-CM

## 2018-12-17 DIAGNOSIS — F43.10: ICD-10-CM

## 2018-12-17 DIAGNOSIS — T40.605A: ICD-10-CM

## 2018-12-17 DIAGNOSIS — B19.20: ICD-10-CM

## 2018-12-17 DIAGNOSIS — Z87.820: ICD-10-CM

## 2018-12-17 LAB
HEPATITIS B SURFACE ANTIGEN: NEGATIVE
INR PPP: 0.89 (ref 0.83–1.16)
PLATELET # BLD: 160 10^3/UL (ref 150–400)
PROTHROMBIN TIME: 12.3 SEC (ref 12–15)

## 2018-12-17 PROCEDURE — C1751 CATH, INF, PER/CENT/MIDLINE: HCPCS

## 2018-12-17 PROCEDURE — P9041 ALBUMIN (HUMAN),5%, 50ML: HCPCS

## 2018-12-17 PROCEDURE — A9585 GADOBUTROL INJECTION: HCPCS

## 2018-12-17 PROCEDURE — G0480 DRUG TEST DEF 1-7 CLASSES: HCPCS

## 2018-12-17 PROCEDURE — G0378 HOSPITAL OBSERVATION PER HR: HCPCS

## 2018-12-17 PROCEDURE — C1769 GUIDE WIRE: HCPCS

## 2018-12-17 PROCEDURE — G0472 HEP C SCREEN HIGH RISK/OTHER: HCPCS

## 2018-12-17 RX ADMIN — OXYCODONE HYDROCHLORIDE PRN MG: 15 TABLET ORAL at 12:31

## 2018-12-17 RX ADMIN — THIAMINE HYDROCHLORIDE SCH MLS: 100 INJECTION, SOLUTION INTRAMUSCULAR; INTRAVENOUS at 05:53

## 2018-12-17 RX ADMIN — OXYCODONE HYDROCHLORIDE PRN MG: 15 TABLET ORAL at 06:02

## 2018-12-17 RX ADMIN — DOCUSATE SODIUM AND SENNOSIDES SCH: 50; 8.6 TABLET ORAL at 22:09

## 2018-12-17 RX ADMIN — Medication SCH MLS: at 20:42

## 2018-12-17 RX ADMIN — NICOTINE SCH MG: 14 PATCH TRANSDERMAL at 08:38

## 2018-12-17 RX ADMIN — OXYCODONE HYDROCHLORIDE PRN MG: 15 TABLET ORAL at 09:49

## 2018-12-17 RX ADMIN — DOCUSATE SODIUM AND SENNOSIDES SCH TAB: 50; 8.6 TABLET ORAL at 08:39

## 2018-12-17 SDOH — ECONOMIC STABILITY - HOUSING INSECURITY: HOMELESSNESS: Z59.0

## 2018-12-17 NOTE — HOSPPROG
Hospitalist Progress Note


Assessment/Plan: 





# ?distal CBD mass vs stone


   - GI consulted (discussed with Dr Collazo) - will check MRCP today


   - LFTs ok


   - oncology aware


# hx etOH - reports sobriety for 5 days


   - follow for w/d, cont thiamine


# hx drug abuse


   - will check hepatitis panel


# hx endocarditis - treated at [facility], does not remember the organism or valve

, but it was non-surgical


# hx closed head injury - may complicate overall care


   - cog eval


# mild hyperNa - cont 1/2 NS








Subjective: conversant


Objective: 


 Vital Signs











Temp Pulse Resp BP Pulse Ox


 


 36.6 C   88   16   151/109 H  97 


 


 12/17/18 07:15  12/17/18 07:15  12/17/18 07:15  12/17/18 07:15  12/17/18 07:15








 











 12/16/18 12/17/18 12/18/18





 05:59 05:59 05:59


 


Intake Total  1000 


 


Balance  1000 








 











PT  12.3 SEC (12.0-15.0)   12/17/18  01:10    


 


INR  0.89  (0.83-1.16)   12/17/18  01:10    








35 minutes of prolonged, direct patient care time





- Physical Exam


Gastrointestinal: other (RUQ TTP), No guarding, No rebound, No distension





ICD10 Worksheet


Patient Problems: 


 Problems











Problem Status Onset


 


Common bile duct mass Acute  


 


Alcohol dependence Acute  


 


Alcoholic intoxication Acute  


 


Dehydration Acute  


 


Delirium tremens Acute  


 


Head injury Acute  


 


Heroin abuse Acute  


 


Hypokalemia Acute  


 


Hypomagnesemia Acute  


 


Pneumonia Acute  


 


Respiratory failure Acute  


 


Seizure Acute
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
## 2018-12-17 NOTE — HOSPPROG
Hospitalist Progress Note


Assessment/Plan: 





Patient has become increasing agitated throughout the day.  He is unable to 

describe to me what he is being treated for.  He appears to be talking to 

someone who is not present in the room.  He wants to leave AMA.  HE is not 

stable on his feet.





He has received a total of ativan 5mg IV today for possible etOH withdrawal.





I do not think he has decisional capacity currently.  I think that leaving 

poses an immediate risk to his health given his current condition.  I have 

placed him on an MIH and completed the paperwork.  





I have written for him to get haldol IV then placed on a precedex gtt.  He will 

get geodon IM if haldol IV is not successful.








35 mins CC time in addition to time spent earlier in the day.








Objective: 


 Vital Signs











Temp Pulse Resp BP Pulse Ox


 


 36.8 C   92   16   149/100 H  91 L


 


 12/17/18 11:45  12/17/18 11:45  12/17/18 11:45  12/17/18 11:45  12/17/18 11:45








 











 12/16/18 12/17/18 12/18/18





 05:59 05:59 05:59


 


Intake Total  1000 


 


Balance  1000 








 











PT  12.3 SEC (12.0-15.0)   12/17/18  01:10    


 


INR  0.89  (0.83-1.16)   12/17/18  01:10    














ICD10 Worksheet


Patient Problems: 


 Problems











Problem Status Onset


 


Alcohol dependence Acute  


 


Heroin abuse Acute  


 


Alcoholic intoxication Acute  


 


Head injury Acute  


 


Pneumonia Acute  


 


Hypomagnesemia Acute  


 


Hypokalemia Acute  


 


Dehydration Acute  


 


Respiratory failure Acute  


 


Delirium tremens Acute  


 


Seizure Acute  


 


Common bile duct mass Acute
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
## 2018-12-17 NOTE — EDPHY
H & P


Time Seen by Provider: 12/17/18 00:51


HPI/ROS: 





HPI





CHIEF COMPLAINT:  Abdominal pain, nausea, vomiting





HISTORY OF PRESENT ILLNESS:  58-year-old male, well known to myself as well as 

the emergency room with a history of alcoholism, alcohol abuse, alcohol 

withdrawal, respiratory failure, presents emergency room stating that he has 

been clean from alcohol for the past 5 days, he staying at a local shelter his 

main complaint is mid abdominal pain burning sensation with associated nausea 

but no vomiting.  No diarrhea.  States this started around 11:00 a.m. This 

evening.  It is now 1:00 a.m..  Complains of burning discomfort mid abdomen 

epigastric region nonradiating.  And hurts when you palpate here.  He denies 

any chest pain or shortness of breath.  No fever.  No shortness of breath.  No 

pleuritic pain.  No lower abdominal pain.





Past Medical History:  Alcoholism daily alcohol use, alcohol dependency, 

alcohol withdrawal, respiratory failure





Past Surgical History:  No recent surgery





Social History:  Denies current use of drugs or alcohol.  At the homeless 

shelter.  Has been clean for 5 days.





Family History:  Noncontributory








ROS   


REVIEW OF SYSTEMS:


10 Systems were reviewed and negative with the exception of the elements 

mentioned in the history of present illness.








Exam   


Constitutional   triage nursing summary reviewed, vital signs reviewed, awake/

alert. 


Eyes   normal conjunctivae and sclera, EOMI, PERRLA. 


HENT   normal inspection, atraumatic, moist mucus membranes, no epistaxis, neck 

supple/ no meningismus, no raccoon eyes. 


Respiratory   clear to auscultation bilaterally, normal breath sounds, no 

respiratory distress, no wheezing. 


Cardiovascular   rate normal, regular rhythm, no murmur, no edema, distal 

pulses normal. 


Gastrointestinal   nontender palpation epigastric, no rebound, no guarding, 

normal bowel sounds, no distension, no pulsatile mass. 


Genitourinary   no CVA tenderness. 


Musculoskeletal  no midline vertebral tenderness, full range of motion, no calf 

swelling, no tenderness of extremities, no meningismus, good pulses, 

neurovascularly intact.


Skin   pink, warm, & dry, no rash, skin atraumatic. 


Neurologic   awake, alert and oriented x 3, AAOx3, moves all 4 extremities 

equally, motor intact, sensory intact, CN II-XII intact, normal cerebellar, 

normal vision, normal speech. 


Psychiatric   normal mood/affect. 


Heme/Lymph/Immune   no lymphadenopathy.





Differential diagnosis includes but is not limited to and in no particular order

:  Bowel obstruction, appendicitis, gallbladder disease, diverticulitis, colitis

, enteritis, perforated viscus, gastritis, GERD, esophagitis, urinary tract 

infection, pyelonephritis, kidney stones








Medical Decision Making:  Plan for this patient IV establishment IV fluid bolus 

IV Pepcid, basic blood work, EKG, CT scan abdomen pelvis with IV contrast, 

check lipase, LFTs.  Drug screen, alcohol.





Re-evaluation:








EKG interpretation by me on record in ITDatabase system.  Impression time of 

EKG 1:17 a.m., sinus rhythm rate of 98 PVC present.  No signs of acute ischemic 

changes when compared to the patient's old EKG dated 10/29/2018 this is 

unchanged.  Unchanged morphology.  Left anterior fascicular block present.





CT scan abdomen pelvis with IV contrast this shows a mass in the distal CBD.  

The common bile duct is dilated 11 mm.  This mass is 9 mm.





This will need further evaluation.





Patient be admitted to the hospital for this.  





I have consult Dr. Davidson who agrees to admit. 


Source: Patient, EMS





- Personal History


Tetanus Vaccine Date: 2015





- Medical/Surgical History


Hx Asthma: No


Hx Chronic Respiratory Disease: No


Hx Diabetes: No


Hx Cardiac Disease: No


Hx Renal Disease: No


Hx Cirrhosis: No


Hx Alcoholism: Yes


Hx HIV/AIDS: No


Hx Splenectomy or Spleen Trauma: No


Other PMH: CHI, PTSD, bipolar, ETOH, TBI. Heroin use.





- Social History


Smoking Status: Current every day smoker


Constitutional: 


 Initial Vital Signs











Temperature (C)  36.4 C   12/17/18 00:57


 


Heart Rate  105 H  12/17/18 00:57


 


Respiratory Rate  18   12/17/18 00:57


 


Blood Pressure  112/86 H  12/17/18 00:57


 


O2 Sat (%)  96   12/17/18 00:57








 











O2 Delivery Mode               Room Air














Allergies/Adverse Reactions: 


 





No Known Allergies Allergy (Verified 12/17/18 00:56)


 








Home Medications: 














 Medication  Instructions  Recorded


 


Buprenorphine HCl/Naloxone HCl 1 each SL DAILY 12/12/18





[Suboxone 8 mg-2 mg Sl Film]  


 


QUEtiapine FUMARATE [Seroquel 200 200 - 400 mg PO DAILY 12/12/18





mg (*)]  


 


traZODone [traZODONE 100MG (*)] 200 mg PO HS 12/12/18


 


Gabapentin [Neurontin 300 MG (*)] 300 mg PO QID 12/17/18


 


Herbals/Supplements -Info Only 1 ea PO DAILY 12/17/18


 


Multivitamins [Multivitamin (*)] 1 each PO DAILY 12/17/18














Medical Decision Making





- Diagnostics


Imaging Results: 


 Imaging Impressions





Guidance Ultrasound  12/17/18 00:00


Impression:  5 French double lumen peripherally inserted central catheter is 

ready to use. 


 


 








Abdomen CT  12/17/18 00:56


Impression:  


1. Hyperdense 9 mm mass within the distal common bile duct, CBD stone versus 

tumor. 


2. Nonobstructing bilateral nephrolithiasis. 


3. Mild haziness in the upper abdominal mesentery without discrete fluid 

collection or ascites.


4. Moderate constipation.


 


The study was performed as an emergency on-call case and discussed by telephone 

with Dr. Teo Serrano at 2:20 a.m.  The final interpretation is concordant 

with the original communication. 


 


 














- Data Points


Laboratory Results: 


 Laboratory Results





 12/17/18 01:10 





 12/17/18 01:10 








Medications Given: 


 





Chlordiazepoxide HCl (Librium)  10 mg PO TID PRN


   PRN Reason: Anxiety, Able to Take PO


   Stop: 06/15/19 03:56


   Last Admin: 12/17/18 05:08 Dose:  10 mg


Haloperidol Lactate (Haldol Injection)  5 mg IVP Q6HRS PRN


   PRN Reason: Agitation


   Stop: 06/15/19 14:57


   Last Admin: 12/17/18 15:21 Dose:  5 mg


Sodium Chloride (1/2 Ns)  1,000 mls @ 75 mls/hr IV CONT LUZ MARIA


   Stop: 06/15/19 02:59


   Last Admin: 12/17/18 05:52 Dose:  1,000 mls


Famotidine/Sodium Chloride (Pepcid 20 Mg (Premix))  50 mls @ 200 mls/hr IV 

Q12HRS LUZ MARIA


   Stop: 06/15/19 20:59


   Last Admin: 12/17/18 20:42 Dose:  50 mls


Thiamine HCl 500 mg/ Sodium (Chloride)  105 mls @ 210 mls/hr IV DAILY LUZ MARIA


   Stop: 06/15/19 04:14


   Last Admin: 12/17/18 05:53 Dose:  105 mls


Dexmedetomidine HCl 400 mcg/ (Sodium Chloride)  104 mls @ 0 mls/hr IV CONT LUZ MARIA; 

Titrate


   PRN Reason: Protocol


   Stop: 06/15/19 14:59


   Last Admin: 12/17/18 20:55 Dose:  104 mls


Lorazepam (Ativan)  0.5 - 1 mg PO Q6HRS PRN


   PRN Reason: Anxiety, Able to Take PO


   Stop: 06/15/19 02:58


   Last Admin: 12/17/18 12:31 Dose:  1 mg


Lorazepam (Ativan Injection)  0 mg IVP Q1H PRN; Protocol


   PRN Reason: Alcohol Withdrawal w/IV access


   Stop: 06/15/19 03:54


   Last Admin: 12/17/18 20:42 Dose:  2 mg


Lorazepam (Ativan Injection)  2 mg IVP Q6 LUZ MARIA


   Stop: 06/15/19 17:59


   Last Admin: 12/17/18 19:08 Dose:  Not Given


Nicotine (Nicoderm Cq)  14 mg TD DAILY LUZ MARIA


   Stop: 06/15/19 08:59


   Last Admin: 12/17/18 08:38 Dose:  14 mg


Oxycodone HCl (Oxycodone Ir)  5 - 10 mg PO Q6HRS PRN


   PRN Reason: Pain, Severe Able to Take PO


   Stop: 12/27/18 02:58


   Last Admin: 12/17/18 12:31 Dose:  10 mg


Quetiapine Fumarate (Seroquel)  200 mg PO HS LUZ MARIA


   Stop: 06/15/19 20:59


   Last Admin: 12/17/18 22:09 Dose:  Not Given


Senna/Docusate Sodium (Senokot-S)  1 - 2 tab PO BID LUZ MARIA


   PRN Reason: Protocol


   Stop: 06/15/19 08:59


   Last Admin: 12/17/18 22:09 Dose:  Not Given


Trazodone HCl (Trazodone)  100 mg PO HS LUZ MARIA


   Stop: 06/15/19 20:59


   Last Admin: 12/17/18 22:10 Dose:  Not Given





Discontinued Medications





Famotidine (Pepcid)  20 mg IVP EDNOW ONE


   Stop: 12/17/18 00:57


   Last Admin: 12/17/18 01:21 Dose:  20 mg


Sodium Chloride (Ns)  1,000 mls @ 0 mls/hr IV EDNOW ONE; Wide Open


   PRN Reason: Protocol


   Stop: 12/17/18 00:57


   Last Admin: 12/17/18 01:19 Dose:  1,000 mls


Lorazepam (Ativan Injection)  1 mg IVP EDNOW ONE


   Stop: 12/17/18 03:08


   Last Admin: 12/17/18 03:13 Dose:  1 mg


Lorazepam (Ativan Injection)  2 mg IM ONCE ONE


   Stop: 12/17/18 14:51


   Last Admin: 12/17/18 19:07 Dose:  Not Given


Morphine Sulfate (Morphine)  1 - 2 mg IVP Q1HR PRN


   PRN Reason: Pain, Breakthrough


   Stop: 12/27/18 02:58


   Last Admin: 12/17/18 03:13 Dose:  2 mg


Ondansetron HCl (Zofran)  4 mg IVP EDNOW ONE


   Stop: 12/17/18 00:57


   Last Admin: 12/17/18 01:21 Dose:  4 mg


Quetiapine Fumarate (Seroquel)  200 mg PO ONCE ONE


   Stop: 12/17/18 09:55


   Last Admin: 12/17/18 11:30 Dose:  200 mg


Ziprasidone (Geodon)  20 mg IM ONCE ONE


   Stop: 12/17/18 14:50


   Last Admin: 12/17/18 21:05 Dose:  Not Given





Point of Care Test Results: 


 Chemistry











  12/17/18





  01:13


 


POC Troponin I  0.01 ng/mL ng/mL





   (0.00-0.08) 














Departure





- Departure


Disposition: [facility]


Clinical Impression: 


 Common bile duct mass





Condition: Fair

## 2018-12-18 LAB
HEPATITIS A ANTIBODY IGM (BCH): NEGATIVE
HEPATITIS B CORE AB IGM: NEGATIVE
HEPATITIS C ANTIBODY TOTAL: REACTIVE

## 2018-12-18 PROCEDURE — 02HV33Z INSERTION OF INFUSION DEVICE INTO SUPERIOR VENA CAVA, PERCUTANEOUS APPROACH: ICD-10-PCS

## 2018-12-18 RX ADMIN — DOCUSATE SODIUM AND SENNOSIDES SCH TAB: 50; 8.6 TABLET ORAL at 21:57

## 2018-12-18 RX ADMIN — FAMOTIDINE SCH MG: 20 TABLET, FILM COATED ORAL at 11:52

## 2018-12-18 RX ADMIN — OXYCODONE HYDROCHLORIDE PRN MG: 15 TABLET ORAL at 07:55

## 2018-12-18 RX ADMIN — METHOCARBAMOL PRN MG: 750 TABLET ORAL at 22:32

## 2018-12-18 RX ADMIN — FAMOTIDINE SCH MG: 20 TABLET, FILM COATED ORAL at 21:58

## 2018-12-18 RX ADMIN — DOCUSATE SODIUM AND SENNOSIDES SCH TAB: 50; 8.6 TABLET ORAL at 07:55

## 2018-12-18 RX ADMIN — ACETAMINOPHEN PRN MG: 325 TABLET ORAL at 11:52

## 2018-12-18 RX ADMIN — OXYCODONE HYDROCHLORIDE PRN MG: 15 TABLET ORAL at 18:09

## 2018-12-18 RX ADMIN — THIAMINE HYDROCHLORIDE SCH: 100 INJECTION, SOLUTION INTRAMUSCULAR; INTRAVENOUS at 08:06

## 2018-12-18 RX ADMIN — OXYCODONE HYDROCHLORIDE PRN MG: 15 TABLET ORAL at 22:32

## 2018-12-18 RX ADMIN — GABAPENTIN SCH MG: 300 CAPSULE ORAL at 16:58

## 2018-12-18 RX ADMIN — NICOTINE SCH MG: 14 PATCH TRANSDERMAL at 08:06

## 2018-12-18 RX ADMIN — Medication SCH: at 08:06

## 2018-12-18 RX ADMIN — GABAPENTIN SCH MG: 300 CAPSULE ORAL at 21:57

## 2018-12-18 NOTE — CPEKG
Test Reason : OPEN

Blood Pressure : ***/*** mmHG

Vent. Rate : 098 BPM     Atrial Rate : 099 BPM

   P-R Int : 136 ms          QRS Dur : 150 ms

    QT Int : 406 ms       P-R-T Axes : 000 -80 063 degrees

   QTc Int : 519 ms

 

Sinus tachycardia

Ventricular premature complex

Nonspecific IVCD with LAD

Inferolateral infarct, old

 

Confirmed by Teo Gomes ([id number]) on 12/18/2018 6:55:45 AM

 

Referred By:             Confirmed By:Teo Gomes

## 2018-12-18 NOTE — ASMTCMCOM
CM Note

 

CM Note                       

Notes:

Patient to have an MRI to check to see if mass vs stone. Patient given Homeless res for [facility] Medicaid info.

 

Date Signed:  12/18/2018 04:28 PM

Electronically Signed By:Julia Goldman LCSW

## 2018-12-18 NOTE — PDMN
Medical Necessity


Medical necessity: Pt meets IP criteria as of 12/18/2018 per MD and MCG [id number] (

gallbladder or bile duct inflammation or stone); los > 2 mn for ongoing tx and 

evaluation of common bile duct mass vs stone as well as polysubstance abuse and 

metabolic encephalopathy; requiring further workup, GI consultation with 

possible intervention, medication management, and therapies.

## 2018-12-18 NOTE — HOSPPROG
Hospitalist Progress Note


Assessment/Plan: 





#CBD mass: MRCP pending. Needs PICC replaced. May need EUS, ERCP. Appreciate GI 

consult





#Polysubstance abuse: counseled on cessation. Last drink > 5 days ago


-resume Suboxone





#h/o endocarditis: long hospitalization at [facility] a few months ago





#Acute metabolic encephalopathy: yesterday. MS clear today. Does not appear to 

be withdrawing form Etoh





#h/o TBI: cog eval





#Mild hyponatremia: 1/2 NS. Repeat in AM





#Diet: clears





Disp: inpatient admission for further imaging possible ERCP


Subjective: crampy epigastric pain


Objective: 


 Vital Signs











Temp Pulse Resp BP Pulse Ox


 


 36.5 C   80   16   119/66   96 


 


 12/18/18 08:00  12/18/18 08:00  12/18/18 08:00  12/18/18 08:00  12/18/18 06:00








 











 12/17/18 12/18/18 12/19/18





 05:59 05:59 05:59


 


Intake Total 1000 833 


 


Balance 1000 833 








 











PT  12.3 SEC (12.0-15.0)   12/17/18  01:10    


 


INR  0.89  (0.83-1.16)   12/17/18  01:10    














- Time Spent With Patient


Time Spent with Patient: greater than 35 minutes


Time Spent with Patient: Greater than 35 minutes spent on this patients care, 

greater than 50% of time spent counseling, educating, and coordinating care 

regarding the above mentioned plan.





- Physical Exam


Constitutional: no apparent distress


Eyes: PERRL


Ears, Nose, Mouth, Throat: moist mucous membranes


Cardiovascular: regular rate and rhythym


Respiratory: no respiratory distress


Gastrointestinal: tenderness (mild epigastric TTP), No guarding, No rebound


Genitourinary: No hicks in urethra


Skin: warm


Neurologic: AAOx3


Psychiatric: interacting appropriately, No encephalopathic





ICD10 Worksheet


Patient Problems: 


 Problems











Problem Status Onset


 


Common bile duct mass Acute  


 


Alcohol dependence Acute  


 


Alcoholic intoxication Acute  


 


Dehydration Acute  


 


Delirium tremens Acute  


 


Head injury Acute  


 


Heroin abuse Acute  


 


Hypokalemia Acute  


 


Hypomagnesemia Acute  


 


Pneumonia Acute  


 


Respiratory failure Acute  


 


Seizure Acute

## 2018-12-19 PROCEDURE — 02HV33Z INSERTION OF INFUSION DEVICE INTO SUPERIOR VENA CAVA, PERCUTANEOUS APPROACH: ICD-10-PCS | Performed by: RADIOLOGY

## 2018-12-19 RX ADMIN — OXYCODONE HYDROCHLORIDE PRN MG: 15 TABLET ORAL at 03:51

## 2018-12-19 RX ADMIN — GABAPENTIN SCH MG: 300 CAPSULE ORAL at 21:51

## 2018-12-19 RX ADMIN — OXYCODONE HYDROCHLORIDE PRN MG: 15 TABLET ORAL at 08:31

## 2018-12-19 RX ADMIN — GABAPENTIN SCH MG: 300 CAPSULE ORAL at 13:32

## 2018-12-19 RX ADMIN — DOCUSATE SODIUM AND SENNOSIDES SCH TAB: 50; 8.6 TABLET ORAL at 21:49

## 2018-12-19 RX ADMIN — GABAPENTIN SCH MG: 300 CAPSULE ORAL at 06:08

## 2018-12-19 RX ADMIN — THERA TABS SCH EACH: TAB at 08:25

## 2018-12-19 RX ADMIN — GABAPENTIN SCH: 300 CAPSULE ORAL at 17:10

## 2018-12-19 RX ADMIN — FAMOTIDINE SCH MG: 20 TABLET, FILM COATED ORAL at 08:25

## 2018-12-19 RX ADMIN — FAMOTIDINE SCH MG: 20 TABLET, FILM COATED ORAL at 21:51

## 2018-12-19 RX ADMIN — NICOTINE SCH MG: 14 PATCH TRANSDERMAL at 08:25

## 2018-12-19 RX ADMIN — DOCUSATE SODIUM AND SENNOSIDES SCH TAB: 50; 8.6 TABLET ORAL at 08:25

## 2018-12-19 NOTE — PDINTPN
Intensivist Progress Note


Assessment/Plan: 


Assessment/plan:


* Abdominal pain-improved


* Gallstone


* Polysubstance abuse


* Alcoholism


* Bipolar disorder


* PTSD


* Disposition-okay for transfer to the medical-surgical floor




















Subjective: 





Resting comfortably.  No current complaints.


Objective: 





 Vital Signs











Temp Pulse Resp BP Pulse Ox


 


 36.5 C   82   18   95/63 L  92 


 


 12/19/18 08:00  12/19/18 08:00  12/19/18 08:00  12/19/18 08:00  12/19/18 08:00








 Laboratory Results





 12/19/18 04:00 





 











 12/18/18 12/19/18 12/20/18





 05:59 05:59 05:59


 


Intake Total  2332 


 


Balance  2332 








 











PT  12.3 SEC (12.0-15.0)   12/17/18  01:10    


 


INR  0.89  (0.83-1.16)   12/17/18  01:10    














- Time Spent With Patient


Time Spent With Patient: 





25 min of time spent with patient, over 1/2 involved with coordination of care 

or counseling.





Physical Exam





- Physical Exam


General Appearance: alert, no apparent distress


EENT: PERRL/EOMI


Neck: non-tender


Respiratory: chest non-tender, lungs clear, normal breath sounds


Cardiac/Chest: normal peripheral pulses, regular rate, rhythm


Peripheral Pulses: 2+: carotid (R), carotid (L), femoral (R), femoral (L), 

dorsalis-pedis (R), dorsalis-pedis (L)


Abdomen: normal bowel sounds, soft, No non-tender


Male Genitalia: deferred


Rectal: deferred


Extremities: non-tender


Neuro/Psych: alert





ICD10 Worksheet


Patient Problems: 


 Problems











Problem Status Onset


 


Common bile duct mass Acute  


 


Alcohol dependence Acute  


 


Alcoholic intoxication Acute  


 


Dehydration Acute  


 


Delirium tremens Acute  


 


Head injury Acute  


 


Heroin abuse Acute  


 


Hypokalemia Acute  


 


Hypomagnesemia Acute  


 


Pneumonia Acute  


 


Respiratory failure Acute  


 


Seizure Acute

## 2018-12-19 NOTE — HOSPPROG
Hospitalist Progress Note


Assessment/Plan: 





#CBD mass: MRCP with CBD stone. G[place]of to see today.





#Polysubstance abuse: counseled on cessation. Last drink > 5 days ago


-resume Suboxone





#h/o endocarditis: long hospitalization at [facility] a few months ago





#Acute metabolic encephalopathy: yesterday. MS clear today. Does not appear to 

be withdrawing form Etoh





#h/o TBI: cog eval





#Mild hyponatremia: resolved





#Diet: clears





Disp: inpatient admission for further imaging possible ERCP


Subjective: mild abd pain


Objective: 


 Vital Signs











Temp Pulse Resp BP Pulse Ox


 


 36.5 C   82   18   95/63 L  92 


 


 12/19/18 08:00  12/19/18 08:00  12/19/18 08:00  12/19/18 08:00  12/19/18 08:00








 Laboratory Results





 12/19/18 04:00 





 











 12/18/18 12/19/18 12/20/18





 05:59 05:59 05:59


 


Intake Total  2332 


 


Balance  2332 








 











PT  12.3 SEC (12.0-15.0)   12/17/18  01:10    


 


INR  0.89  (0.83-1.16)   12/17/18  01:10    














- Physical Exam


Constitutional: no apparent distress


Eyes: PERRL


Ears, Nose, Mouth, Throat: moist mucous membranes


Cardiovascular: regular rate and rhythym


Respiratory: no respiratory distress


Gastrointestinal: tenderness (mild epigastric)


Musculoskeletal: full muscle strength


Neurologic: AAOx3


Psychiatric: not encephalopathic, flat affect, poor insight





ICD10 Worksheet


Patient Problems: 


 Problems











Problem Status Onset


 


Common bile duct mass Acute  


 


Alcohol dependence Acute  


 


Alcoholic intoxication Acute  


 


Dehydration Acute  


 


Delirium tremens Acute  


 


Head injury Acute  


 


Heroin abuse Acute  


 


Hypokalemia Acute  


 


Hypomagnesemia Acute  


 


Pneumonia Acute  


 


Respiratory failure Acute  


 


Seizure Acute

## 2018-12-19 NOTE — HOSPPROG
Hospitalist Progress Note


Assessment/Plan: 





#CBD mass: MRCP with CBD stone. Plan for ERCP tomorrow





#Polysubstance abuse: counseled on cessation. Last drink > 5 days ago


-resume Suboxone





#h/o endocarditis: long hospitalization at [facility] a few months ago





#Acute metabolic encephalopathy: resolved. MS clear today. Does not appear to 

be withdrawing form Etoh





#h/o TBI: cog eval





#Mild hyponatremia: resolved





#Diet: clears





Disp: inpatient admission for ERCP. Case d/w Dr. Collazo


Subjective: still with epigastric pain


Objective: 


 Vital Signs











Temp Pulse Resp BP Pulse Ox


 


 36.5 C   82   18   95/63 L  92 


 


 12/19/18 08:00  12/19/18 08:00  12/19/18 08:00  12/19/18 08:00  12/19/18 08:00








 Laboratory Results





 12/19/18 04:00 





 











 12/18/18 12/19/18 12/20/18





 05:59 05:59 05:59


 


Intake Total  2332 


 


Balance  2332 








 











PT  12.3 SEC (12.0-15.0)   12/17/18  01:10    


 


INR  0.89  (0.83-1.16)   12/17/18  01:10    














- Time Spent With Patient


Time Spent with Patient: greater than 35 minutes


Time Spent with Patient: Greater than 35 minutes spent on this patients care, 

greater than 50% of time spent counseling, educating, and coordinating care 

regarding the above mentioned plan.





- Physical Exam


Constitutional: no apparent distress


Eyes: PERRL


Ears, Nose, Mouth, Throat: moist mucous membranes


Cardiovascular: regular rate and rhythym


Respiratory: no respiratory distress


Gastrointestinal: normoactive bowel sounds, tenderness (mild epigastric pain)


Genitourinary: No hicks in urethra


Skin: warm


Musculoskeletal: full muscle strength


Neurologic: AAOx3, CN II-XII Intact


Psychiatric: interacting appropriately





ICD10 Worksheet


Patient Problems: 


 Problems











Problem Status Onset


 


Common bile duct mass Acute  


 


Alcohol dependence Acute  


 


Alcoholic intoxication Acute  


 


Dehydration Acute  


 


Delirium tremens Acute  


 


Head injury Acute  


 


Heroin abuse Acute  


 


Hypokalemia Acute  


 


Hypomagnesemia Acute  


 


Pneumonia Acute  


 


Respiratory failure Acute  


 


Seizure Acute

## 2018-12-20 LAB
INR PPP: 0.9 (ref 0.83–1.16)
PLATELET # BLD: 155 10^3/UL (ref 150–400)
PROTHROMBIN TIME: 12.4 SEC (ref 12–15)

## 2018-12-20 PROCEDURE — 0DJ08ZZ INSPECTION OF UPPER INTESTINAL TRACT, VIA NATURAL OR ARTIFICIAL OPENING ENDOSCOPIC: ICD-10-PCS | Performed by: INTERNAL MEDICINE

## 2018-12-20 RX ADMIN — DOCUSATE SODIUM AND SENNOSIDES SCH TAB: 50; 8.6 TABLET ORAL at 20:55

## 2018-12-20 RX ADMIN — OXYCODONE HYDROCHLORIDE PRN MG: 15 TABLET ORAL at 02:25

## 2018-12-20 RX ADMIN — OXYCODONE HYDROCHLORIDE PRN MG: 15 TABLET ORAL at 18:23

## 2018-12-20 RX ADMIN — GABAPENTIN SCH MG: 300 CAPSULE ORAL at 05:32

## 2018-12-20 RX ADMIN — THERA TABS SCH EACH: TAB at 08:48

## 2018-12-20 RX ADMIN — GABAPENTIN SCH MG: 300 CAPSULE ORAL at 20:55

## 2018-12-20 RX ADMIN — FAMOTIDINE SCH MG: 20 TABLET, FILM COATED ORAL at 20:55

## 2018-12-20 RX ADMIN — GABAPENTIN SCH: 300 CAPSULE ORAL at 12:14

## 2018-12-20 RX ADMIN — OXYCODONE HYDROCHLORIDE PRN MG: 15 TABLET ORAL at 18:25

## 2018-12-20 RX ADMIN — GABAPENTIN SCH MG: 300 CAPSULE ORAL at 14:21

## 2018-12-20 RX ADMIN — Medication SCH MG: at 08:48

## 2018-12-20 RX ADMIN — NICOTINE SCH MG: 14 PATCH TRANSDERMAL at 08:48

## 2018-12-20 RX ADMIN — Medication SCH MG: at 02:26

## 2018-12-20 RX ADMIN — FAMOTIDINE SCH MG: 20 TABLET, FILM COATED ORAL at 08:48

## 2018-12-20 RX ADMIN — DOCUSATE SODIUM AND SENNOSIDES SCH TAB: 50; 8.6 TABLET ORAL at 08:48

## 2018-12-20 NOTE — HOSPPROG
Hospitalist Progress Note


Assessment/Plan: 





#CBD mass: MRCP with CBD stone. ERCP today





#Polysubstance abuse: counseled on cessation. Last drink > 5 days ago


-Suboxone





#h/o endocarditis: long hospitalization at [facility] a few months ago





#Acute metabolic encephalopathy: yesterday. MS clear today. Does not appear to 

be withdrawing form Etoh





#h/o TBI: cog eval 15/30 done on 17th when MS altered. Rec repeating eval





#Mild hyponatremia: resolved





#Diet: clears





Disp: inpatient admission for further imaging possible ERCP


Subjective: still with mild epigastric pain. BM yesterday


Objective: 


 Vital Signs











Temp Pulse Resp BP Pulse Ox


 


 36.5 C   98   12   99/69 L  93 


 


 12/20/18 08:00  12/20/18 08:00  12/20/18 08:00  12/20/18 08:00  12/20/18 08:00








 Laboratory Results





 12/20/18 05:35 





 12/20/18 05:35 





 











 12/19/18 12/20/18 12/21/18





 05:59 05:59 05:59


 


Intake Total 2332  


 


Balance 2332  








 











PT  12.4 SEC (12.0-15.0)   12/20/18  05:35    


 


INR  0.90  (0.83-1.16)   12/20/18  05:35    














- Time Spent With Patient


Time Spent with Patient: greater than 35 minutes


Time Spent with Patient: Greater than 35 minutes spent on this patients care, 

greater than 50% of time spent counseling, educating, and coordinating care 

regarding the above mentioned plan.





ICD10 Worksheet


Patient Problems: 


 Problems











Problem Status Onset


 


Common bile duct mass Acute  


 


Alcohol dependence Acute  


 


Alcoholic intoxication Acute  


 


Dehydration Acute  


 


Delirium tremens Acute  


 


Head injury Acute  


 


Heroin abuse Acute  


 


Hypokalemia Acute  


 


Hypomagnesemia Acute  


 


Pneumonia Acute  


 


Respiratory failure Acute  


 


Seizure Acute

## 2018-12-20 NOTE — PDANEPAE
ANE History of Present Illness





CBD stone





ANE Past Medical History





- Cardiovascular History


Hx Hypertension: No


Hx Arrhythmias: No





- Pulmonary History


Hx COPD: No


Hx Asthma/Reactive Airway Disease: No


Hx Oxygen in Use at Home: No


Hx Sleep Apnea: Yes


Sleep Apnea Screening Result - Last Documented: Positive





- Endocrine History


Hx Diabetes: No





ANE Review of Systems


Review of Systems: 








ANE Patient History





- Allergies


Allergies/Adverse Reactions: 








No Known Allergies Allergy (Verified 12/17/18 00:56)


 








- Home Medications


Home medications: home medication list seen and reviewed


Home Medications: 








Buprenorphine HCl/Naloxone HCl [Suboxone 8 mg-2 mg Sl Film] 1 each SL DAILY 12/ 12/18 [Last Taken Unknown]


QUEtiapine FUMARATE [Seroquel 200 mg (*)] 200 - 400 mg PO DAILY 12/12/18 [Last 

Taken Unknown]


traZODone [traZODONE 100MG (*)] 200 mg PO HS 12/12/18 [Last Taken Unknown]


Gabapentin [Neurontin 300 MG (*)] 300 mg PO QID 12/17/18 [Last Taken Unknown]


Herbals/Supplements -Info Only 1 ea PO DAILY 12/17/18 [Last Taken Unknown]


Multivitamins [Multivitamin (*)] 1 each PO DAILY 12/17/18 [Last Taken Unknown]








- NPO status


NPO Status: no food or drink >8 hours (Jello and black coffee 08:00)


NPO Since - Liquids (Date): 12/20/18


NPO Since - Liquids (Time): 07:00


NPO Since - Solids (Date): 12/20/18


NPO Since - Solids (Time): 07:00





- Smoking Hx


Smoking Status: Current every day smoker





- Alcohol Use


Alcohol Use: Sober (Five days previously heavy use of liquor.)





ANE Labs/Vital Signs





- Labs


Result Diagrams: 


 12/20/18 05:35





 12/20/18 05:35





- Vital Signs


Blood Pressure: 99/69


Heart Rate: 98


Respiratory Rate: 12


O2 Sat (%): 93


Height: 172.72 cm


Weight: 58.06 kg





ANE Physical Exam





- Airway


Neck exam: decreased ROM


Mallampati Score: Class 2


Mouth exam: poor dentition





- Pulmonary


Pulmonary: no respiratory distress





- Cardiovascular


Cardiovascular: regular rate and rhythym





- ASA Status


ASA Status: III





ANE Anesthesia Plan


Anesthesia Plan: general endotracheal anesthesia

## 2018-12-20 NOTE — GIREPORT
[facility]

Surgical Services - Endoscopy Department

_____________________________________________________________________________________________________
_______

Patient Name: Tomás Quiroga                         Procedure Date: 12/20/2018 11:26 AM

MRN: [id number]                                       Account Number: [id number]

Patient Type: Inpatient                              Attending  MD/ ER Physician: Scar Collazo MD

_____________________________________________________________________________________________________
_______

 

Procedure:                    ERCP

Indications:                  Bile duct stone on magnetic resonance cholangiopancreatography, Suspect
ed 

                              bile duct stone(s)

Providers:                    Scar Collazo MD

Medicines:                    General Anesthesia

Complications:                No immediate complications.

Description of Procedure:     After obtaining informed consent, the scope was passed under direct vis
ion. 

                              Throughout the procedure, the patient's blood pressure, pulse, and oxyg
en 

                              saturations were monitored continuously. The Duodenalscope was introduc
ed 

                              through the mouth, and advanced to the duodenum without successful 

                              cannulation. The ERCP was unusually difficult due to abnormal anatomy a
nd 

                              challenging cannulation because of inability to visualize the major pap
illa. 

                              The patient tolerated the procedure well. The total duration of the 

                              procedure was 35 minutes. The [id number] Endosonoscope was then utilized t
o 

                              better define his anatomy and investigate the duodenal diverticula in a
n 

                              effort to locate the major papilla. It was introduced through the mouth
, and 

                              advanced to the 3rd portion of the duodenum without successful 

                              identification of the major papilla.

Findings:                     The stomach was markedly J-shaped and full of solid food. The side view
ing 

                              endoscope was ultimately advanced into the 3rd portion of the duodenum 


                              successfully. There was a larger diverticula in the 2nd portion of the 


                              duodenum. There was also irregular folds downstream of the diverticula.
 Both 

                              of these areas were inspected extensively, probing with the sphincterot
ome 

                              and even using a forward viewing endoscope to completely interrogate th
e 

                              diverticula.

                              No major papilla was able to be identified.

                              The procedure was aborted.

                              OTHER

Estimated Blood Loss:         Estimated blood loss: none.

Post Op Diagnosis:            J-shaped stomach with retained food. This likely represent gastroparesi
s due 

                              to chronic pain medications.

                              Larger dudoenal diverticula in the 2nd portion of the duodenum.

                              Irregular dudodenal folds with signficant fold redundancy.

                              Non-visualization of the major papillae.

                              Aborted ERCP due to this latter technical limitation.

                              - A large amount of food (residue) in the stomach.

Recommendation:               - [facility] care.

                              - Clear liquid diet today.

                              - NPO after midnight.

                              - IR to place percutaneous biliary wire tomorrow for ERCP at 15:30 on 

                              12/21/18 for reattempt at stone extraction.

                              - Patient aware

                              - Thank you for allowing me to be involved in the care of your patient.


Attending Participation:

     I personally performed the entire procedure without the assistance of a fellow, resident or surg
ical 

     assistant.

 

Scar Collazo MD

_______________

Scar Collazo MD

12/20/2018 1:38:06 PM

This report has been signed electronicallyDavid MD Zay

Number of Addenda: 0

 

Note Initiated On: 12/20/2018 11:26 AM

[email or web address]

## 2018-12-21 RX ADMIN — GABAPENTIN SCH MG: 300 CAPSULE ORAL at 05:31

## 2018-12-21 RX ADMIN — FAMOTIDINE SCH MG: 20 TABLET, FILM COATED ORAL at 21:46

## 2018-12-21 RX ADMIN — OXYCODONE HYDROCHLORIDE PRN MG: 15 TABLET ORAL at 15:04

## 2018-12-21 RX ADMIN — GABAPENTIN SCH MG: 300 CAPSULE ORAL at 21:46

## 2018-12-21 RX ADMIN — NICOTINE SCH MG: 14 PATCH TRANSDERMAL at 09:03

## 2018-12-21 RX ADMIN — OXYCODONE HYDROCHLORIDE PRN MG: 15 TABLET ORAL at 19:27

## 2018-12-21 RX ADMIN — GABAPENTIN SCH MG: 300 CAPSULE ORAL at 13:02

## 2018-12-21 RX ADMIN — FAMOTIDINE SCH MG: 20 TABLET, FILM COATED ORAL at 09:02

## 2018-12-21 RX ADMIN — DOCUSATE SODIUM AND SENNOSIDES SCH TAB: 50; 8.6 TABLET ORAL at 09:04

## 2018-12-21 RX ADMIN — THERA TABS SCH EACH: TAB at 09:02

## 2018-12-21 RX ADMIN — DOCUSATE SODIUM AND SENNOSIDES SCH TAB: 50; 8.6 TABLET ORAL at 21:46

## 2018-12-21 RX ADMIN — METHOCARBAMOL PRN MG: 750 TABLET ORAL at 09:42

## 2018-12-21 RX ADMIN — GABAPENTIN SCH MG: 300 CAPSULE ORAL at 15:04

## 2018-12-21 RX ADMIN — Medication SCH MG: at 09:04

## 2018-12-21 RX ADMIN — OXYCODONE HYDROCHLORIDE PRN MG: 15 TABLET ORAL at 09:04

## 2018-12-21 NOTE — PDANEPAE
ANE History of Present Illness





59 yo male with biliary stones.





ANE Past Medical History





- Cardiovascular History


Hx Hypertension: No


Hx Arrhythmias: Yes


Hx Chest Pain: No


Hx Coronary Artery / Peripheral Vascular Disease: No


Cardiovascular History Comment: irregular heart beat on propranolol until a few 

yrs ago when pt stopped taking it.





- Pulmonary History


Hx COPD: No


Hx Asthma/Reactive Airway Disease: No


Hx Oxygen in Use at Home: No


Hx Sleep Apnea: No


Sleep Apnea Screening Result - Last Documented: Positive


Pulmonary History Comment: Pt denies any JOSH, but does report insomnia.





- Endocrine History


Hx Diabetes: No





- Renal History


Hx Renal Disorders: No





- Liver History


Hx Hepatic Disorders: Yes


Hepatic History Comment: hepatitis C





- Neurological & Psychiatric Hx


Hx Neurological and Psychiatric Disorders: Yes


Neurological / Psychiatric History Comment: bipolar d/o, h/o heroine use





- Cancer History


Hx Cancer: No





- GI History


Hx Gastrointestinal Disorders: No





ANE Review of Systems


Review of Systems: 








- Systems


Gastrointestinal: Reports: abdominal pain


Muscolosketal: Reports: neck pain





ANE Patient History





- Allergies


Allergies/Adverse Reactions: 








No Known Allergies Allergy (Verified 12/17/18 00:56)


 








- Home Medications


Home Medications: 








Buprenorphine HCl/Naloxone HCl [Suboxone 8 mg-2 mg Sl Film] 1 each SL DAILY 12/ 12/18 [Last Taken Unknown]


QUEtiapine FUMARATE [Seroquel 200 mg (*)] 200 - 400 mg PO DAILY 12/12/18 [Last 

Taken Unknown]


traZODone [traZODONE 100MG (*)] 200 mg PO HS 12/12/18 [Last Taken Unknown]


Gabapentin [Neurontin 300 MG (*)] 300 mg PO QID 12/17/18 [Last Taken Unknown]


Herbals/Supplements -Info Only 1 ea PO DAILY 12/17/18 [Last Taken Unknown]


Multivitamins [Multivitamin (*)] 1 each PO DAILY 12/17/18 [Last Taken Unknown]








- NPO status


NPO Since - Liquids (Date): 12/20/18


NPO Since - Liquids (Time): 07:00


NPO Since - Solids (Date): 12/20/18


NPO Since - Solids (Time): 07:00





- Anes Hx


Anes Hx: no prior problems





- Smoking Hx


Smoking Status: Current every day smoker





- Alcohol Use


Alcohol Use: Heavy (Five days previously heavy use of liquor. Drinks 1 pint of 

hard liquor daily.)





ANE Labs/Vital Signs





- Labs


Result Diagrams: 


 12/20/18 05:35





 12/20/18 05:35





- Vital Signs


Blood Pressure: 103/76


Heart Rate: 72


Respiratory Rate: 16


O2 Sat (%): 97


Height: 172.72 cm


Weight: 58.06 kg





ANE Physical Exam





- Airway


Neck exam: decreased ROM


Mallampati Score: Class 3


Mouth exam: poor dentition





- Pulmonary


Pulmonary: reduced air movement, expiratory wheeze, rhonchi





- Cardiovascular


Cardiovascular: regular rate and rhythym





- ASA Status


ASA Status: IV





ANE Anesthesia Plan


Anesthesia Plan: general endotracheal anesthesia
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
## 2018-12-22 PROCEDURE — BF131ZZ FLUOROSCOPY OF GALLBLADDER AND BILE DUCTS USING LOW OSMOLAR CONTRAST: ICD-10-PCS | Performed by: INTERNAL MEDICINE

## 2018-12-22 RX ADMIN — GABAPENTIN SCH MG: 300 CAPSULE ORAL at 21:30

## 2018-12-22 RX ADMIN — Medication SCH: at 14:45

## 2018-12-22 RX ADMIN — OXYCODONE HYDROCHLORIDE PRN MG: 15 TABLET ORAL at 16:18

## 2018-12-22 RX ADMIN — OXYCODONE HYDROCHLORIDE PRN MG: 15 TABLET ORAL at 21:29

## 2018-12-22 RX ADMIN — NICOTINE SCH MG: 14 PATCH TRANSDERMAL at 14:25

## 2018-12-22 RX ADMIN — DOCUSATE SODIUM AND SENNOSIDES SCH TAB: 50; 8.6 TABLET ORAL at 21:30

## 2018-12-22 RX ADMIN — DOCUSATE SODIUM AND SENNOSIDES SCH: 50; 8.6 TABLET ORAL at 14:45

## 2018-12-22 RX ADMIN — FAMOTIDINE SCH: 20 TABLET, FILM COATED ORAL at 14:45

## 2018-12-22 RX ADMIN — GABAPENTIN SCH: 300 CAPSULE ORAL at 06:39

## 2018-12-22 RX ADMIN — GABAPENTIN SCH MG: 300 CAPSULE ORAL at 16:01

## 2018-12-22 RX ADMIN — KETOROLAC TROMETHAMINE PRN MG: 30 INJECTION, SOLUTION INTRAMUSCULAR at 14:19

## 2018-12-22 RX ADMIN — GABAPENTIN SCH: 300 CAPSULE ORAL at 14:45

## 2018-12-22 NOTE — PDANEPAE
ANE Past Medical History





- Cardiovascular History


Hx Hypertension: No


Hx Arrhythmias: Yes


Hx Chest Pain: No


Hx Coronary Artery / Peripheral Vascular Disease: No


Cardiovascular History Comment: irregular heart beat on propranolol until a few 

yrs ago when pt stopped taking it.





- Pulmonary History


Hx COPD: No


Hx Asthma/Reactive Airway Disease: No


Hx Oxygen in Use at Home: No


Hx Sleep Apnea: Yes


Sleep Apnea Screening Result - Last Documented: Positive


Pulmonary History Comment: Pt denies any JOSH, but does report insomnia.





- Endocrine History


Hx Diabetes: No





- Renal History


Hx Renal Disorders: No





- Liver History


Hx Hepatic Disorders: Yes


Hepatic History Comment: hepatitis C





- Neurological & Psychiatric Hx


Hx Neurological and Psychiatric Disorders: Yes


Neurological / Psychiatric History Comment: bipolar d/o, h/o heroine use





- Cancer History


Hx Cancer: No





- GI History


Hx Gastrointestinal Disorders: No





ANE Review of Systems


Review of Systems: 








ANE Patient History





- Allergies


Allergies/Adverse Reactions: 








No Known Allergies Allergy (Verified 12/17/18 00:56)


 








- Home Medications


Home Medications: 








Buprenorphine HCl/Naloxone HCl [Suboxone 8 mg-2 mg Sl Film] 1 each SL DAILY 12/ 12/18 [Last Taken Unknown]


QUEtiapine FUMARATE [Seroquel 200 mg (*)] 200 - 400 mg PO DAILY 12/12/18 [Last 

Taken Unknown]


traZODone [traZODONE 100MG (*)] 200 mg PO HS 12/12/18 [Last Taken Unknown]


Gabapentin [Neurontin 300 MG (*)] 300 mg PO QID 12/17/18 [Last Taken Unknown]


Herbals/Supplements -Info Only 1 ea PO DAILY 12/17/18 [Last Taken Unknown]


Multivitamins [Multivitamin (*)] 1 each PO DAILY 12/17/18 [Last Taken Unknown]








- NPO status


NPO Since - Liquids (Date): 12/22/18


NPO Since - Liquids (Time): 00:00


NPO Since - Solids (Date): 12/22/18


NPO Since - Solids (Time): 00:00





- Smoking Hx


Smoking Status: Current every day smoker





- Alcohol Use


Alcohol Use: Heavy (Five days previously heavy use of liquor. Drinks 1 pint of 

hard liquor daily.)





ANE Labs/Vital Signs





- Labs


Result Diagrams: 


 12/20/18 05:35





 12/20/18 05:35





- Vital Signs


Blood Pressure: 113/78


Heart Rate: 104


Respiratory Rate: 12


O2 Sat (%): 91


Height: 172.72 cm


Weight: 58.06 kg





ANE Physical Exam





- Airway


Neck exam: FROM


Mallampati Score: Class 2


Mouth exam: poor dentition





- Pulmonary


Pulmonary: no respiratory distress





- Cardiovascular


Cardiovascular: regular rate and rhythym





- ASA Status


ASA Status: III





ANE Anesthesia Plan


Anesthesia Plan: general endotracheal anesthesia

## 2018-12-22 NOTE — ASMTCMCOM
CM Note

 

CM Note                       

Notes:

Spoke with pt's RN and reviewed chart. Pt is homeless and struggles with mental health and 

substance abuse. Pt is easily agitated and was too agitated after MRCP today to speak with CM. Pt 

[facility] Pt will likely need shelter bed and 

transportation upon discharge. [facility] CM to follow. 



D/C Plan: Shelter bed

 

Date Signed:  12/22/2018 04:58 PM

Electronically Signed By:Ariella Aaron

## 2018-12-22 NOTE — HOSPPROG
Hospitalist Progress Note


Assessment/Plan: 





*  Retained CBD stone s/p failed ERCP


   -s/p IR transhepatic guided wire with successful repeat ERCP


   -recheck LFT/lipase in am


*  Toxic/metabolic encephalopathy


   -extreme confusion today post anesthesia


   -tried to leave but did not have decisional capacity to do so


   -now improved - ? extreme anesthesia reaction


*  Gastroparesis


   -due to narcotics


*  Recent endocarditis


*  h/o TBI with cognitive dysfunction


*  Heroin abuse


   -Suboxone


*  Etoh abuse


   -no further evidence for withdrawal


*  Bipolar/PTSD


   -seroquel + trazodone


*  Tobacco dependence


   -patch


*  Hep C


   -check HIV

















Subjective: Very agitated and confused after procedure today


Objective: 


 Vital Signs











Temp Pulse Resp BP Pulse Ox


 


 36.9 C   102 H  18   92/60 L  96 


 


 12/22/18 17:16  12/22/18 17:16  12/22/18 17:16  12/22/18 17:16  12/22/18 17:16








 Laboratory Results





 12/20/18 05:35 





 12/20/18 05:35 





 











 12/21/18 12/22/18 12/23/18





 05:59 05:59 05:59


 


Intake Total 600 0 700


 


Output Total 500  0


 


Balance 100 0 700








 











PT  12.4 SEC (12.0-15.0)   12/20/18  05:35    


 


INR  0.90  (0.83-1.16)   12/20/18  05:35    








MRCP - distal CBD stone 11mm


EKG viewed,  my personal interpretation is - NSR, no ischemic change








- Physical Exam


Constitutional: appears nourished, not in pain, unkempt, No no apparent distress


Cardiovascular: regular rate and rhythym, no murmur, rub, or gallop


Respiratory: no respiratory distress, no rales or rhonchi, clear to auscultation


Gastrointestinal: normoactive bowel sounds, soft, non-tender abdomen, no 

palpable masses


Skin: no rashes or abrasions, no fluctuance, no induration


Neurologic: No AAOx3


Psychiatric: encephalopathic, agitated, poor insight, poor judgement, poor 

memory, No interacting appropriately, No thought process linear





ICD10 Worksheet


Patient Problems: 


 Problems











Problem Status Onset


 


Alcohol dependence Acute  


 


Heroin abuse Acute  


 


Alcoholic intoxication Acute  


 


Head injury Acute  


 


Pneumonia Acute  


 


Hypomagnesemia Acute  


 


Hypokalemia Acute  


 


Dehydration Acute  


 


Respiratory failure Acute  


 


Delirium tremens Acute  


 


Seizure Acute  


 


Common bile duct mass Acute

## 2018-12-23 LAB — PLATELET # BLD: 133 10^3/UL (ref 150–400)

## 2018-12-23 RX ADMIN — GABAPENTIN SCH: 300 CAPSULE ORAL at 20:10

## 2018-12-23 RX ADMIN — TAZOBACTAM SODIUM AND PIPERACILLIN SODIUM SCH MLS: 375; 3 INJECTION, SOLUTION INTRAVENOUS at 17:34

## 2018-12-23 RX ADMIN — TAZOBACTAM SODIUM AND PIPERACILLIN SODIUM SCH MLS: 375; 3 INJECTION, SOLUTION INTRAVENOUS at 06:32

## 2018-12-23 RX ADMIN — DEXTROSE MONOHYDRATE, SODIUM CHLORIDE, AND POTASSIUM CHLORIDE SCH MLS: 50; 9; 1.49 INJECTION, SOLUTION INTRAVENOUS at 13:36

## 2018-12-23 RX ADMIN — NICOTINE SCH MG: 14 PATCH TRANSDERMAL at 10:10

## 2018-12-23 RX ADMIN — GABAPENTIN SCH: 300 CAPSULE ORAL at 16:56

## 2018-12-23 RX ADMIN — KETOROLAC TROMETHAMINE PRN MG: 30 INJECTION, SOLUTION INTRAMUSCULAR at 20:08

## 2018-12-23 RX ADMIN — DOCUSATE SODIUM AND SENNOSIDES SCH TAB: 50; 8.6 TABLET ORAL at 10:06

## 2018-12-23 RX ADMIN — OXYCODONE HYDROCHLORIDE PRN MG: 15 TABLET ORAL at 05:21

## 2018-12-23 RX ADMIN — GABAPENTIN SCH MG: 300 CAPSULE ORAL at 05:21

## 2018-12-23 RX ADMIN — Medication SCH MG: at 10:06

## 2018-12-23 RX ADMIN — TAZOBACTAM SODIUM AND PIPERACILLIN SODIUM SCH MLS: 375; 3 INJECTION, SOLUTION INTRAVENOUS at 13:36

## 2018-12-23 RX ADMIN — GABAPENTIN SCH: 300 CAPSULE ORAL at 12:03

## 2018-12-23 RX ADMIN — DOCUSATE SODIUM AND SENNOSIDES SCH: 50; 8.6 TABLET ORAL at 20:10

## 2018-12-23 RX ADMIN — ACETAMINOPHEN PRN MG: 325 TABLET ORAL at 05:20

## 2018-12-23 RX ADMIN — DAPTOMYCIN SCH MLS: 500 INJECTION, POWDER, LYOPHILIZED, FOR SOLUTION INTRAVENOUS at 21:03

## 2018-12-23 NOTE — HOSPPROG
Hospitalist Progress Note


Assessment/Plan: 





Called by nursing regarding persistent hypotension--BP most recently 79/51--in 

review has been low much of the day, has had 1.5L NS bolus with minimal 

response. On evaluation, patient confused but awake and alert ,per nursing 

confusion has been persistent. Reviewed micro data: blood cxs x 2 with GPC in 

chains, on zosyn. Will add vanco for now given persistent hypotension pending 

final culture data. Will check lactate, give another 500ml bolus. If lactate 

significantly elevated or bp does not respond will re-evaluate, consider ICU 

transfer.


Objective: 


 Vital Signs











Temp Pulse Resp BP Pulse Ox


 


 36.9 C   93   20   87/58 L  90 L


 


 12/23/18 16:00  12/23/18 16:00  12/23/18 16:00  12/23/18 16:00  12/23/18 16:00








 Laboratory Results





 12/23/18 06:10 





 12/23/18 06:10 





 











 12/22/18 12/23/18 12/24/18





 05:59 05:59 05:59


 


Intake Total 0 2066 


 


Output Total  350 


 


Balance 0 1716 








 











PT  12.4 SEC (12.0-15.0)   12/20/18  05:35    


 


INR  0.90  (0.83-1.16)   12/20/18  05:35    














ICD10 Worksheet


Patient Problems: 


 Problems











Problem Status Onset


 


Alcohol dependence Acute  


 


Heroin abuse Acute  


 


Alcoholic intoxication Acute  


 


Head injury Acute  


 


Pneumonia Acute  


 


Hypomagnesemia Acute  


 


Hypokalemia Acute  


 


Dehydration Acute  


 


Respiratory failure Acute  


 


Delirium tremens Acute  


 


Seizure Acute  


 


Common bile duct mass Acute

## 2018-12-23 NOTE — HOSPPROG
Hospitalist Progress Note


Assessment/Plan: 





*  Retained CBD stone s/p failed ERCP


   -s/p IR transhepatic guided wire with successful repeat ERCP


   -LFT/lipase up a bit, but not more than anticipated for difficult procedure


      -recheck am


*  Toxic/metabolic encephalopathy due to anesthesia + infection


   -remains very confused today


*  Fever - suspect due to difficult manipulation of biliary tree


   -empiric IV Zosyn


*  Hypotension - responding to IVF bolus


   -suspect dehydration + infection


*  Gastroparesis


   -due to narcotics


*  Recent endocarditis


*  h/o TBI with cognitive dysfunction


*  Heroin abuse


   -Suboxone


*  Etoh abuse


   -no further evidence for withdrawal


*  Bipolar/PTSD


   -seroquel + trazodone


*  Tobacco dependence


   -patch


*  Hep C


   -check HIV














High risk due to frequent low BP throughout day today requiring frequent repeat 

IVF bolus


Subjective: talking about missing his guitar and amp


Objective: 


 Vital Signs











Temp Pulse Resp BP Pulse Ox


 


 37.1 C   82   18   90/63 L  86 L


 


 12/23/18 11:44  12/23/18 11:44  12/23/18 11:44  12/23/18 13:39  12/23/18 14:59








 Laboratory Results





 12/23/18 06:10 





 12/23/18 06:10 





 











 12/22/18 12/23/18 12/24/18





 05:59 05:59 05:59


 


Intake Total 0 2066 


 


Output Total  350 


 


Balance 0 1716 








 











PT  12.4 SEC (12.0-15.0)   12/20/18  05:35    


 


INR  0.90  (0.83-1.16)   12/20/18  05:35    














- Time Spent With Patient


Time Spent with Patient: greater than 35 minutes


Time Spent with Patient: Greater than 35 minutes spent on this patients care, 

greater than 50% of time spent counseling, educating, and coordinating care 

regarding the above mentioned plan.





- Physical Exam


Constitutional: no apparent distress, appears nourished, not in pain


Cardiovascular: regular rate and rhythym, no murmur, rub, or gallop


Respiratory: no respiratory distress, no rales or rhonchi, clear to auscultation


Gastrointestinal: normoactive bowel sounds, soft, non-tender abdomen, no 

palpable masses


Skin: no rashes or abrasions, no fluctuance, no induration


Neurologic: No AAOx3


Psychiatric: encephalopathic, poor insight, poor judgement, poor memory, No 

interacting appropriately, No thought process linear, No agitated





ICD10 Worksheet


Patient Problems: 


 Problems











Problem Status Onset


 


Common bile duct mass Acute  


 


Alcohol dependence Acute  


 


Alcoholic intoxication Acute  


 


Dehydration Acute  


 


Delirium tremens Acute  


 


Head injury Acute  


 


Heroin abuse Acute  


 


Hypokalemia Acute  


 


Hypomagnesemia Acute  


 


Pneumonia Acute  


 


Respiratory failure Acute  


 


Seizure Acute

## 2018-12-23 NOTE — SOAPPROG
SOAP Progress Note


Assessment/Plan: 


Assessment/Plan:





1.  Pt. states less pain today, but somewhat lethargic, so difficult historian.

  TB and alk phos increased, but suspect due to edema from stone removal 

yesterday only.  Lipase in the 700s is expected after ERCP, and doubt 

significant.





- recheck LFTs, lipase in AM





2.  Fever.  Suspect from I.R. and ERCP manipulations yesterday.  An ongoing 

infectious process is possible, but somewhat less likely.  Afeb now, but 

decreased SBP.





- IV fluids


- agree with piperacillin





12/23/18 12:48





Subjective: 





cc:  cbd stone





States less abdominal pain that on admission, but somewhat lethargic.  Did 

spike a fever;  afeb now, but SBP somewhat low.  Denies vomiting, rigors, 

sweats.  Tolerating po well.


Objective: 





 Vital Signs











Temp Pulse Resp BP Pulse Ox


 


 37.1 C   82   18   67/56 L  94 


 


 12/23/18 11:44  12/23/18 11:44  12/23/18 11:44  12/23/18 11:44  12/23/18 11:44








 Laboratory Results





 12/23/18 06:10 





 12/23/18 06:10 





 











 12/22/18 12/23/18 12/24/18





 05:59 05:59 05:59


 


Intake Total 0 2066 


 


Output Total  350 


 


Balance 0 1716 








 











PT  12.4 SEC (12.0-15.0)   12/20/18  05:35    


 


INR  0.90  (0.83-1.16)   12/20/18  05:35    














Physical Exam





- Physical Exam


General Appearance: WD/WN, No alert (lethargic)


EENT: PERRL/EOMI, normal ENT inspection, pharynx normal, TMs normal


Neck: non-tender, full range of motion, supple, normal inspection


Respiratory: chest non-tender, lungs clear, normal breath sounds


Cardiac/Chest: normal peripheral pulses, regular rate, rhythm


Peripheral Pulses: 2+: carotid (R), carotid (L), femoral (R), femoral (L), 

dorsalis-pedis (R), dorsalis-pedis (L)


Abdomen: soft, No non-tender (mild RUQ tenderness)


Male Genitalia: deferred


Rectal: deferred


Back: Normal inspection


Skin: normal color, warm/dry


Lymphatic: no adenopathy


Extremities: normal range of motion, non-tender, normal inspection, normal 

capillary refill


Neuro/Psych: no motor/sensory deficits, alert, normal mood/affect, oriented x 3





ICD10 Worksheet


Patient Problems: 


 Problems











Problem Status Onset


 


Common bile duct mass Acute  


 


Alcohol dependence Acute  


 


Alcoholic intoxication Acute  


 


Dehydration Acute  


 


Delirium tremens Acute  


 


Head injury Acute  


 


Heroin abuse Acute  


 


Hypokalemia Acute  


 


Hypomagnesemia Acute  


 


Pneumonia Acute  


 


Respiratory failure Acute  


 


Seizure Acute

## 2018-12-23 NOTE — HOSPPROG
Hospitalist Progress Note


Assessment/Plan: 


XC: Patient febrile (39.2) and tachycardic this morning. No symptoms aside from 

confusion after ERCP. He received dose of Unasyn after procedure. I will obtain 

blood cultures, lactate, and CBC. I will give 1 L NS and also start Zosyn 

noting recent manipulation of biliary tree. 


Objective: 


 Vital Signs











Temp Pulse Resp BP Pulse Ox


 


 39.2 C H  116 H  20   97/69 L  92 


 


 12/23/18 04:00  12/23/18 04:00  12/23/18 04:00  12/23/18 04:00  12/23/18 04:00








 Laboratory Results





 12/20/18 05:35 





 12/20/18 05:35 





 











 12/21/18 12/22/18 12/23/18





 05:59 05:59 05:59


 


Intake Total 600 0 1400


 


Output Total 500  0


 


Balance 100 0 1400








 











PT  12.4 SEC (12.0-15.0)   12/20/18  05:35    


 


INR  0.90  (0.83-1.16)   12/20/18  05:35    














ICD10 Worksheet


Patient Problems: 


 Problems











Problem Status Onset


 


Alcohol dependence Acute  


 


Heroin abuse Acute  


 


Alcoholic intoxication Acute  


 


Head injury Acute  


 


Pneumonia Acute  


 


Hypomagnesemia Acute  


 


Hypokalemia Acute  


 


Dehydration Acute  


 


Respiratory failure Acute  


 


Delirium tremens Acute  


 


Seizure Acute  


 


Common bile duct mass Acute

## 2018-12-24 LAB
HIV TYPE 1 AND 2: NEGATIVE
PLATELET # BLD: 142 10^3/UL (ref 150–400)

## 2018-12-24 RX ADMIN — TAZOBACTAM SODIUM AND PIPERACILLIN SODIUM SCH MLS: 375; 3 INJECTION, SOLUTION INTRAVENOUS at 18:20

## 2018-12-24 RX ADMIN — GABAPENTIN SCH MG: 300 CAPSULE ORAL at 12:23

## 2018-12-24 RX ADMIN — DOCUSATE SODIUM AND SENNOSIDES SCH TAB: 50; 8.6 TABLET ORAL at 19:55

## 2018-12-24 RX ADMIN — NICOTINE SCH MG: 14 PATCH TRANSDERMAL at 08:54

## 2018-12-24 RX ADMIN — MULTIPLE VITAMINS W/ MINERALS TAB SCH EACH: TAB at 18:56

## 2018-12-24 RX ADMIN — Medication SCH MG: at 08:54

## 2018-12-24 RX ADMIN — DAPTOMYCIN SCH MLS: 500 INJECTION, POWDER, LYOPHILIZED, FOR SOLUTION INTRAVENOUS at 19:48

## 2018-12-24 RX ADMIN — KETOROLAC TROMETHAMINE PRN MG: 30 INJECTION, SOLUTION INTRAMUSCULAR at 08:51

## 2018-12-24 RX ADMIN — GABAPENTIN SCH MG: 300 CAPSULE ORAL at 15:34

## 2018-12-24 RX ADMIN — OXYCODONE HYDROCHLORIDE PRN MG: 15 TABLET ORAL at 08:53

## 2018-12-24 RX ADMIN — TAZOBACTAM SODIUM AND PIPERACILLIN SODIUM SCH MLS: 375; 3 INJECTION, SOLUTION INTRAVENOUS at 23:59

## 2018-12-24 RX ADMIN — TAZOBACTAM SODIUM AND PIPERACILLIN SODIUM SCH MLS: 375; 3 INJECTION, SOLUTION INTRAVENOUS at 00:10

## 2018-12-24 RX ADMIN — DEXTROSE MONOHYDRATE, SODIUM CHLORIDE, AND POTASSIUM CHLORIDE SCH MLS: 50; 9; 1.49 INJECTION, SOLUTION INTRAVENOUS at 01:00

## 2018-12-24 RX ADMIN — ACETAMINOPHEN PRN MG: 325 TABLET ORAL at 12:23

## 2018-12-24 RX ADMIN — DOCUSATE SODIUM AND SENNOSIDES SCH TAB: 50; 8.6 TABLET ORAL at 08:54

## 2018-12-24 RX ADMIN — TAZOBACTAM SODIUM AND PIPERACILLIN SODIUM SCH MLS: 375; 3 INJECTION, SOLUTION INTRAVENOUS at 05:41

## 2018-12-24 RX ADMIN — GABAPENTIN SCH MG: 300 CAPSULE ORAL at 19:56

## 2018-12-24 RX ADMIN — METHOCARBAMOL PRN MG: 750 TABLET ORAL at 19:55

## 2018-12-24 RX ADMIN — TAZOBACTAM SODIUM AND PIPERACILLIN SODIUM SCH MLS: 375; 3 INJECTION, SOLUTION INTRAVENOUS at 11:22

## 2018-12-24 RX ADMIN — GABAPENTIN SCH MG: 300 CAPSULE ORAL at 05:41

## 2018-12-24 NOTE — HOSPPROG
Hospitalist Progress Note


Assessment/Plan: 








DIAGNOSES: 


*  Acute Sepsis with increased fevers and hypotension, 


*  Enterococcal Bacteremia with growth in all bottles


   -antibiotics include daptomycin, Zosyn


   -chart mentions history of recent endocarditis


*  acute cholecystitis is suspected with increasing right o'clock upper 

quadrant pain and tenderness


*  Retained CBD stone s/p failed ERCP


   -s/p IR transhepatic guided wire with successful repeat ERCP


*  Toxic/metabolic encephalopathy due to anesthesia + infection


   -improved today


*  Gastroparesis


   -due to narcotics


*  h/o TBI with cognitive dysfunction


*  Heroin abuse


   -Suboxone


*  Etoh abuse


   -no further evidence for withdrawal


*  Bipolar/PTSD


   -seroquel + trazodone


*  Tobacco dependence


   -patch


*  Hep C


   -HIV negative





PLANS:


* Continue current antibiotics, await final blood culture sensitivities


* Follow vital signs very closely


* Continue Suboxone, careful pain management


* Watch for withdrawal from any of his substances


* Appears to be in need of having his gallbladder removed, will review with 

surgery and Infectious Disease regarding timing


I have reviewed with Dr Cruz today, will be reviewing with ID and surgery 

consultants





SUBJECTIVE:


Worsened right upper quadrant and right flank pain overnight


Some nausea but no vomiting, is eating small amounts of food but this increases 

his pain


Some flatus





OBJECTIVE


Vitals reviewed:  So far this morning temperatures and blood pressures are 

better than last night, stable pulse and respirations


Cardiac Monitor, my review:  Sinus





Exam:


alert oriented normally interactive


skin warm dry color ok, no jaundice


resps not labored


lungs clear BSs


heart regular


abd soft, bowel sounds present, mildly distended with significant tenderness 

with guarding in the right upper quadrant and right flank, no signs of 

peritonitis


limbs warm, no edema





iv site ok





Laboratory data:


Bilirubin and alk-phos now normalized, lipase also normal


White blood cell count is increased today


Renal function stable, electrolytes good





Microbiology:


Enterococcus growing in blood cultures, all bottles











Objective: 


 Vital Signs











Temp Pulse Resp BP Pulse Ox


 


 36.4 C   70   14   106/67   95 


 


 12/24/18 07:59  12/24/18 07:59  12/24/18 07:59  12/24/18 07:59  12/24/18 07:59








 Microbiology











 12/23/18 06:10 Blood Panel (PCR) - Final





 Blood    Enterococcus Species


 


 12/23/18 06:30 Blood Panel (PCR) - Final





 Blood 








 Laboratory Results





 12/24/18 04:35 





 12/24/18 04:35 





 











 12/23/18 12/24/18 12/25/18





 06:59 06:59 06:59


 


Intake Total 6650 4919 


 


Output Total 350  


 


Balance 1716 4919 








 











PT  12.4 SEC (12.0-15.0)   12/20/18  05:35    


 


INR  0.90  (0.83-1.16)   12/20/18  05:35    














- Time Spent With Patient


Time Spent with Patient: greater than 35 minutes


Time Spent with Patient: Greater than 35 minutes spent on this patients care, 

greater than 50% of time spent counseling, educating, and coordinating care 

regarding the above mentioned plan.





ICD10 Worksheet


Patient Problems: 


 Problems











Problem Status Onset


 


Common bile duct mass Acute  


 


Alcohol dependence Acute  


 


Alcoholic intoxication Acute  


 


Dehydration Acute  


 


Delirium tremens Acute  


 


Head injury Acute  


 


Heroin abuse Acute  


 


Hypokalemia Acute  


 


Hypomagnesemia Acute  


 


Pneumonia Acute  


 


Respiratory failure Acute  


 


Seizure Acute

## 2018-12-24 NOTE — PCMIDPN
Assessment/Plan: 


Assessment:


Enterococcal bacteremia-almost certainly secondary to ascending cholangitis due 

to the large obstructing biliary duct stone.  Patient does have a history of 

endocarditis although it was culture negative and occurred in early 2017. 

Patient was treated at multiple institutions.  His treatment was interrupted by 

his leaving institutions against medical advice.  Repeat echocardiogram done 

today shows no evidence of valvular vegetations.  Patient is currently being 

treated on daptomycin and Zosyn.  Will continue both antibiotics at present.  

The daptomycin will clearly cover the enterococcus and the Zosyn will be broad-

spectrum GI coverage given the mechanism of ascending cholangitis.  We will 

wait for the cultures to mature before consolidating treatment.





Plan:


1. Continue both IV daptomycin and Zosyn at present empirically.


2. Follow up on identification of pathogen in initial blood cultures.  Also 

follow up on sensitivities.


3. Follow up blood cultures in 1-2 days.








  





Subjective: 


Patient is resting comfortably in his hospital bed.  He has numerous 

comorbidities that make his recall of his history a bit foggy.  He denies any 

fevers or chills.  Has appetite for breakfast this morning.  No other new 

complaints.





Objective: 


Daptomycin # 2


Zosyn # 1 Vital Signs











Temp Pulse Resp BP Pulse Ox


 


 36.7 C   91   16   124/83 H  92 


 


 12/24/18 10:00  12/24/18 10:00  12/24/18 10:00  12/24/18 10:00  12/24/18 10:00








 Microbiology











 12/23/18 06:10 Blood Panel (PCR) - Final





 Blood    Enterococcus Species


 


 12/23/18 06:30 Blood Panel (PCR) - Final





 Blood 








 Laboratory Results





 12/24/18 04:35 





 12/24/18 04:35 





 











 12/23/18 12/24/18 12/25/18





 05:59 05:59 05:59


 


Intake Total 2066 4919 


 


Output Total 350  


 


Balance 1716 4919 














- Physical Exam


General Appearance: WD/WN, alert, no apparent distress, non-toxic


Respiratory: lungs clear, normal breath sounds, No respiratory distress


Cardiac/Chest: regular rate, rhythm, No tachycardia, No systolic murmur


Skin: normal color, warm/dry, No rash


Neuro/Psych: alert, normal mood/affect, oriented x 3





ICD10 Worksheet


Patient Problems: 


 Problems











Problem Status Onset


 


Common bile duct mass Acute  


 


Alcohol dependence Acute  


 


Alcoholic intoxication Acute  


 


Dehydration Acute  


 


Delirium tremens Acute  


 


Head injury Acute  


 


Heroin abuse Acute  


 


Hypokalemia Acute  


 


Hypomagnesemia Acute  


 


Pneumonia Acute  


 


Respiratory failure Acute  


 


Seizure Acute

## 2018-12-24 NOTE — PDINTPN
Intensivist Progress Note


Assessment/Plan: 





ASSESSMENT


57 yo male with polysubstance abuse (IV heroin, etoh) admitted with abdominal 

pain and found to have ascending cholangitis and severe sepsis due to 

obstructing biliary stone status post ERCP.





# ascending cholangitis


# enterococcal bacteremia. TTE on 12/24/18 without e/o endocarditis


# severe sepsis


# obstructive biliary stone


# hypotension


# history of culture negative endocarditis


# polysubstance abuse. IV heroin and etoh. No e/o w/d at this point


# bipolar mood d/o





PLAN


# continue zosyn and dapto per ID


# agree with CT scan per GI


# will likely need cholecystectomy, defer to surgery and GI regarding timing.


# add multivitamin/thiamine and folic acid given etoh dependence


# seroquel and trazodone for bipolar


# buprenorphine for heroin addiction


# Feeding - NPO


# Analgesia APAP, buprenorphine


# Sedation propofol


# Thromboprophylaxis - SQ hep


# Head of bed elevated


# Ulcer prophylaxis - H2 blocker


# Glucose SSI


# Skin no skin breakdown


# Delirium - delirium precautions





CX Data


12/23/18 BCx enterococcus





EVENTS


12/23/18 attempted ERCP followed by cholangiogram and wire placement by IR





IMAGING


reviewed














12/24/18 13:38





Subjective: 





Transfer to ICU overnight for hypotension concern for septic shock.  Stabilized 

after aggressive IV fluid resuscitation.  Patient complains of mild ongoing 

right upper quadrant pain.  Denies new fevers nausea vomiting shortness of 

breath or rashes


Objective: 





 Vital Signs











Temp Pulse Resp BP Pulse Ox


 


 36.7 C   88   12   96/56 L  91 L


 


 12/24/18 10:00  12/24/18 12:00  12/24/18 12:00  12/24/18 12:00  12/24/18 12:00








 Microbiology











 12/23/18 06:10 Blood Panel (PCR) - Final





 Blood    Enterococcus Species


 


 12/23/18 06:30 Blood Panel (PCR) - Final





 Blood 








 Laboratory Results





 12/24/18 04:35 





 12/24/18 04:35 





 











 12/23/18 12/24/18 12/25/18





 05:59 05:59 05:59


 


Intake Total 2066 4919 480


 


Output Total 350  


 


Balance 1716 4919 480








 











PT  12.4 SEC (12.0-15.0)   12/20/18  05:35    


 


INR  0.90  (0.83-1.16)   12/20/18  05:35    














Physical Exam





- Physical Exam


General Appearance: alert, no apparent distress


EENT: PERRL/EOMI, normal ENT inspection


Neck: non-tender, normal inspection


Respiratory: chest non-tender, lungs clear, normal breath sounds


Cardiac/Chest: normal peripheral pulses, regular rate, rhythm, No edema, No 

gallop


Abdomen: other (mild tenderness RUQ)


Skin: normal color, warm/dry, No cyanosis


Neuro/Psych: no motor/sensory deficits, alert, oriented x 3, other (flat affect)





ICD10 Worksheet


Patient Problems: 


 Problems











Problem Status Onset


 


Common bile duct mass Acute  


 


Alcohol dependence Acute  


 


Alcoholic intoxication Acute  


 


Dehydration Acute  


 


Delirium tremens Acute  


 


Head injury Acute  


 


Heroin abuse Acute  


 


Hypokalemia Acute  


 


Hypomagnesemia Acute  


 


Pneumonia Acute  


 


Respiratory failure Acute  


 


Seizure Acute

## 2018-12-24 NOTE — SOAPPROG
SOAP Progress Note


Assessment/Plan: 





Addendum:





CT doesn't show abscess, perf, etc.  However, a residual filling defect is seen 

in the distal cbd, which does not necessarily look like 'tic or blood.  ? 

residual cbd stone or sludge.  Otherwise, CT with fluid overload.





Assessment/Plan:





- reERCP tomorrow.  Now that he has a sphincterotomy, hopefully will be easier 

and definitive, without need for rendezvous, etc.  If difficult, we might need 

to place a stent, and repeat in 6 weeks.


- diuresis, as per hospitalist service, critical care








12/24/18 18:06





Objective: 





 Vital Signs











Temp Pulse Resp BP Pulse Ox


 


 36.5 C   78   15   86/58 L  94 


 


 12/24/18 15:44  12/24/18 15:44  12/24/18 15:44  12/24/18 15:44  12/24/18 15:44








 Microbiology











 12/23/18 06:30 Blood Panel (PCR) - Final





 Blood 


 


 12/23/18 06:10 Blood Panel (PCR) - Final





 Blood    Enterococcus Species








 Laboratory Results





 12/24/18 04:35 





 12/24/18 04:35 





 











 12/23/18 12/24/18 12/25/18





 05:59 05:59 05:59


 


Intake Total 2066 4919 1780


 


Output Total 350  


 


Balance 1716 4919 1780








 











PT  12.4 SEC (12.0-15.0)   12/20/18  05:35    


 


INR  0.90  (0.83-1.16)   12/20/18  05:35    














ICD10 Worksheet


Patient Problems: 


 Problems











Problem Status Onset


 


Common bile duct mass Acute  


 


Alcohol dependence Acute  


 


Alcoholic intoxication Acute  


 


Dehydration Acute  


 


Delirium tremens Acute  


 


Head injury Acute  


 


Heroin abuse Acute  


 


Hypokalemia Acute  


 


Hypomagnesemia Acute  


 


Pneumonia Acute  


 


Respiratory failure Acute  


 


Seizure Acute

## 2018-12-24 NOTE — ECHO
[email or web address]





[address] [address] 

Main: [phone] 



Fax: 



Transthoracic Echocardiogram 

Name:             DHAVAL LLAMAS                        MR#:

[id number]

Study Date:       2018                            Study Time:

09:32 AM

YOB: 1960                            Age:

58 year(s)

Height:           172.7 cm (68 in.)                     Weight:

58.06 kg (128 lb.)

BSA:              1.69 m2                               Gender:

Male

Examination:      Echo                                  Indication:

Bile duct obstruction, Bacteremia

Image Quality:                                          Contrast: 

Requested by:     Chau Mcpherson                        BP:

106 mmHg/67 mmHg

Heart Rate:                                             Rhythm:

Normal sinus rhythm with ectopy

Indication:       Bile duct obstruction, Bacteremia 



Procedure Staff 

Ultrasound Technician:   Wilmer Klein RDCS 

Reading Physician:       Serge Brown MD 

Requesting Provider: 



Conclusions:           Normal size left ventricle.  

Normal global systolic LV function.  

EF is 64 %.  

No regional wall motion abnormality.  

Grade 1 diastolic dysfunction (abnormal relaxation).  

Normal RV function.  

The left atrium is normal in size.  

The right atrium is normal in size.  

The mitral valve is normal in appearance and function.  

The aortic valve is tri-leaflet.  

The tricuspid valve is normal in appearance and function.  

The pulmonic valve is normal in appearance and function.  

There is no obvious vegetation noted on the aortic or mitral valves.. 



Measurements: 

Chambers                    Valvular Assessment AV/MV

Valvular Assessment TV/PV



Normal                                    Normal

Normal

Name         Value     Range              Name         Value Range

Name           Value Range

A[place] (MM): 3.4 cm    (2.2 cm-3.7            AV Vmax:     1.30 m/s (1

m/s-1.7        TR Vmax:       2.81 mm/s ( - )



cm)                                   m/s)             TR PGmax:

32 mmHg ( - )

IVSd (2D):   0.9 cm (0.6 cm-1.1               AV maxP mmHg ( -

)           syst. PAP: 42 mmHg  ( - )



cm)                LVOT Vmax:   0.76 m/s (0.7 m/s-1.1      PV Vmax:

1.06 m/s (0.6 m/s-0.9

LVDd (2D):   5.1 cm    (4.2 cm-5.9                               m/s)

m/s)



cm)                MV E Vmax:   0.49 m/s ( - )         PV PGmax:

4  mmHg ( - )

LVDs (2D):   3.3 cm    (2.1 cm-4              MV A Vmax:   0.74 m/s (

- )



cm)                MV E/A:      0.66  ( - )  

LVPWd (2D):  1.0 cm    (0.6 cm-1 



cm)   

LVEF (2D):   64        (>=54 %)   



Patient: DHAVAL LLAMAS                      MRN: [id number]

Study Date: 2018   Page 1 of 2

09:32 AM 









Continued Measurements: 

Chambers                      Valvular Assessment AV/MV

Valvular Assessment TV/PV



Name                       Value          Name

Value     Name                      Value

LADs Lon.5 cm               MV E' Septal:

0.08  m/s   CVP (est.):             10 mmHg

LA Area:                 17.6 cm2         MV E/E' Septal:

6.40

LA Volume:               55 ml            MV E/E' Lateral:

8.00

LA Volume Index:         32.5 ml/m2   



Findings:              Left Ventricle: 

Normal size left ventricle. No LV hypertrophy. Normal global systolic

LV function. EF is 64 %. No

regional wall motion abnormality. Grade 1 diastolic dysfunction

(abnormal relaxation).

Right Ventricle: 

Normal size right ventricle. Normal RV function.  

Left Atrium: 

The left atrium is normal in size.  

Right Atrium: 

The right atrium is normal in size.  

Mitral Valve: 

The mitral valve is normal in appearance and function. There is no

significant mitral valve

regurgitation.  

Aortic Valve: 

The aortic valve is tri-leaflet. There is no significant aortic valve

regurgitation.

Tricuspid Valve: 

The tricuspid valve is normal in appearance and function. Trivial to

mild tricuspid valve regurgitation.

The pulmonary artery pressure is normal.  

Pulmonic Valve: 

The pulmonic valve is normal in appearance and function.  

Aorta: 

The aorta is normal.  

Pericardium: 

No pericardial effusion.  

Exam Comments: 

There is no obvious vegetation noted on the aortic or mitral valves.. 







Electronically signed by Serge Brown MD on 2018 at 10:37 AM 

(No Signature Object) 



Patient: DHAVAL LLAMAS                      MRN: [id number]

Study Date: 2018   Page 2 of 2

09:32 AM 







[id number]

## 2018-12-25 LAB — PLATELET # BLD: 151 10^3/UL (ref 150–400)

## 2018-12-25 PROCEDURE — 0FC98ZZ EXTIRPATION OF MATTER FROM COMMON BILE DUCT, VIA NATURAL OR ARTIFICIAL OPENING ENDOSCOPIC: ICD-10-PCS | Performed by: INTERNAL MEDICINE

## 2018-12-25 RX ADMIN — DOCUSATE SODIUM AND SENNOSIDES SCH TAB: 50; 8.6 TABLET ORAL at 21:08

## 2018-12-25 RX ADMIN — DOCUSATE SODIUM AND SENNOSIDES SCH TAB: 50; 8.6 TABLET ORAL at 09:42

## 2018-12-25 RX ADMIN — GABAPENTIN SCH MG: 300 CAPSULE ORAL at 05:10

## 2018-12-25 RX ADMIN — FOLIC ACID SCH MG: 1 TABLET ORAL at 09:42

## 2018-12-25 RX ADMIN — GABAPENTIN SCH MG: 300 CAPSULE ORAL at 16:03

## 2018-12-25 RX ADMIN — GABAPENTIN SCH MG: 300 CAPSULE ORAL at 15:59

## 2018-12-25 RX ADMIN — METHOCARBAMOL PRN MG: 750 TABLET ORAL at 21:08

## 2018-12-25 RX ADMIN — GABAPENTIN SCH: 300 CAPSULE ORAL at 16:02

## 2018-12-25 RX ADMIN — NICOTINE SCH MG: 14 PATCH TRANSDERMAL at 09:42

## 2018-12-25 RX ADMIN — OXYCODONE HYDROCHLORIDE PRN MG: 15 TABLET ORAL at 02:52

## 2018-12-25 RX ADMIN — TAZOBACTAM SODIUM AND PIPERACILLIN SODIUM SCH MLS: 375; 3 INJECTION, SOLUTION INTRAVENOUS at 15:59

## 2018-12-25 RX ADMIN — DAPTOMYCIN SCH MLS: 500 INJECTION, POWDER, LYOPHILIZED, FOR SOLUTION INTRAVENOUS at 21:08

## 2018-12-25 RX ADMIN — MULTIPLE VITAMINS W/ MINERALS TAB SCH EACH: TAB at 09:42

## 2018-12-25 RX ADMIN — TAZOBACTAM SODIUM AND PIPERACILLIN SODIUM SCH MLS: 375; 3 INJECTION, SOLUTION INTRAVENOUS at 05:10

## 2018-12-25 RX ADMIN — Medication SCH MG: at 09:43

## 2018-12-25 RX ADMIN — GABAPENTIN SCH MG: 300 CAPSULE ORAL at 21:08

## 2018-12-25 RX ADMIN — TAZOBACTAM SODIUM AND PIPERACILLIN SODIUM SCH MLS: 375; 3 INJECTION, SOLUTION INTRAVENOUS at 17:40

## 2018-12-25 NOTE — SOAPPROG
SOAP Progress Note


Assessment/Plan: 





Assessment/Plan:





CT with a residual filling defect in the distal cbd, which does not necessarily 

look like 'tic or blood.  ? residual cbd stone or sludge.








- reERCP tomorrow.  Now that he has a sphincterotomy, hopefully will be easier 

and definitive, without need for rendezvous, etc.  If difficult, we might need 

to place a stent, and repeat in 6 weeks.  With his bacteremia, etc., high risk 

for this procedure, but suspect will do well.








12/25/18 14:54





Subjective: 





cc:  ruq pain





Still with the above.  No rigors, chills, sweat.


Objective: 





 Vital Signs











Temp Pulse Resp BP Pulse Ox


 


 36.2 C   109 H  20   103/72   96 


 


 12/25/18 14:38  12/25/18 14:50  12/25/18 14:50  12/25/18 14:50  12/25/18 14:50








 Microbiology











 12/23/18 06:30 Blood Culture - Final





 Blood    Enterococcus Faecium Vre





 Blood Panel (PCR) - Final


 


 12/23/18 06:10 Blood Culture - Final





 Blood    Enterococcus Faecium Vre





 Blood Panel (PCR) - Final





    Enterococcus Species








 Laboratory Results





 12/25/18 11:00 





 12/24/18 04:35 





 











 12/24/18 12/25/18 12/26/18





 05:59 05:59 05:59


 


Intake Total 4919 3362 750


 


Output Total   0


 


Balance 4919 3362 750








 











PT  12.4 SEC (12.0-15.0)   12/20/18  05:35    


 


INR  0.90  (0.83-1.16)   12/20/18  05:35    














Physical Exam





- Physical Exam


General Appearance: WD/WN, alert, no apparent distress


EENT: PERRL/EOMI, normal ENT inspection, pharynx normal, TMs normal


Neck: non-tender, full range of motion, supple, normal inspection


Respiratory: chest non-tender, lungs clear, normal breath sounds


Cardiac/Chest: normal peripheral pulses, regular rate, rhythm


Peripheral Pulses: 2+: carotid (R), carotid (L), femoral (R), femoral (L), 

dorsalis-pedis (R), dorsalis-pedis (L)


Abdomen: normal bowel sounds, soft, No non-tender (RUQ mild tenderness)


Male Genitalia: deferred


Rectal: deferred


Back: Normal inspection


Skin: normal color, warm/dry


Lymphatic: no adenopathy


Extremities: normal range of motion, non-tender, normal inspection, normal 

capillary refill


Neuro/Psych: no motor/sensory deficits, alert, normal mood/affect, oriented x 3





ICD10 Worksheet


Patient Problems: 


 Problems











Problem Status Onset


 


Common bile duct mass Acute  


 


Alcohol dependence Acute  


 


Alcoholic intoxication Acute  


 


Dehydration Acute  


 


Delirium tremens Acute  


 


Head injury Acute  


 


Heroin abuse Acute  


 


Hypokalemia Acute  


 


Hypomagnesemia Acute  


 


Pneumonia Acute  


 


Respiratory failure Acute  


 


Seizure Acute
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
## 2018-12-25 NOTE — PDINTPN
Intensivist Progress Note


Assessment/Plan: 





ASSESSMENT


59 yo male with polysubstance abuse (IV heroin, etoh) admitted with abdominal 

pain and found to have ascending cholangitis and severe sepsis due to 

obstructing biliary stone status post ERCP.





# ascending cholangitis


# enterococcal bacteremia. TTE on 12/24/18 without e/o endocarditis


# severe sepsis


# obstructive biliary stone


# bilateral pleural effusions, suspect sympathetic given abdominal pathology 

with a component of hypervolemia. 


# hypotension


# history of culture negative endocarditis


# polysubstance abuse. IV heroin and etoh. No e/o w/d at this point


# bipolar mood d/o





PLAN


# plans for repeat ERCP today


# repeat blood cultures today


#continue zosyn and dapto per ID


# agree with scheduled diuresis


# follow pleural effusions. Repeat CXR in 1-2 days, if effusions, worsening 

despite diuresis, may need thoracentesis. 


# will likely need cholecystectomy, defer to surgery and GI regarding timing.


# multivitamin/thiamine and folic acid given etoh dependence


# seroquel and trazodone for bipolar mood disorder


# buprenorphine for heroin addiction


# Feeding - NPO


# Analgesia APAP, buprenorphine


# Sedation none


# Thromboprophylaxis - SCDs, hold heparin for procedure


# Head of bed elevated


# Ulcer prophylaxis - NA


# Glucose SSI


# Skin no skin breakdown


# Delirium - delirium precautions





CX Data


12/23/18 BCx enterococcus





EVENTS


12/23/18 attempted ERCP followed by cholangiogram and wire placement by IR





IMAGING


Personally reviewed interpreted images well as formal radiology reads. 


12/24/18 CT abdomen. no abscess. Residual 7 mm stone vs clot. Mild to moderate 

pleural effusions. Diffuse mesentary edema








12/25/18 09:22





Objective: 





 Vital Signs











Temp Pulse Resp BP Pulse Ox


 


 36.8 C   103 H  14   119/75   91 L


 


 12/25/18 08:00  12/25/18 08:00  12/25/18 08:00  12/25/18 08:00  12/25/18 08:00








 Microbiology











 12/23/18 06:10 Blood Panel (PCR) - Final





 Blood    Enterococcus Species


 


 12/23/18 06:30 Blood Panel (PCR) - Final





 Blood 








 Laboratory Results





 12/24/18 04:35 





 12/24/18 04:35 





 











 12/24/18 12/25/18 12/26/18





 05:59 05:59 05:59


 


Intake Total 4948 7852 


 


Balance 4919 3362 








 











PT  12.4 SEC (12.0-15.0)   12/20/18  05:35    


 


INR  0.90  (0.83-1.16)   12/20/18  05:35    














Physical Exam





- Physical Exam


General Appearance: alert


EENT: PERRL/EOMI, normal ENT inspection


Neck: full range of motion, supple


Respiratory: chest non-tender, lungs clear, crackles


Cardiac/Chest: normal peripheral pulses, regular rate, rhythm


Abdomen: other (Mild tenderness to palpation right upper quadrant), No distended

, No guarding


Extremities: normal range of motion, non-tender


Neuro/Psych: no motor/sensory deficits, alert, normal mood/affect, oriented x 3





ICD10 Worksheet


Patient Problems: 


 Problems











Problem Status Onset


 


Common bile duct mass Acute  


 


Alcohol dependence Acute  


 


Alcoholic intoxication Acute  


 


Dehydration Acute  


 


Delirium tremens Acute  


 


Head injury Acute  


 


Heroin abuse Acute  


 


Hypokalemia Acute  


 


Hypomagnesemia Acute  


 


Pneumonia Acute  


 


Respiratory failure Acute  


 


Seizure Acute

## 2018-12-25 NOTE — GIREPORT
[facility]

Surgical Services - Endoscopy Department

_____________________________________________________________________________________________________
_______

Patient Name: Tomás Quiroga                         Procedure Date: 12/25/2018 12:19 PM

MRN: [id number]                                       Account Number: [id number]

Patient Type: Inpatient                              Attending  MD/ ER Physician: Leandro Jacobson MD

_____________________________________________________________________________________________________
_______

 

Procedure:                    ERCP

Indications:                  Suspected bile duct stone

Providers:                    Leandro Jacobson MD, [facility]

Referring MD:                 [facility] Hospitalist service

Medicines:                    See the Anesthesia note for documentation of the administered medicatio
ns, 

                              Indomethacin 100 mg CT

Complications:                No immediate complications.

Description of Procedure:     After obtaining informed consent, the scope was passed under direct vis
ion. 

                              Throughout the procedure, the patient's blood pressure, pulse, and oxyg
en 

                              saturations were monitored continuously. The Duodenalscope was introduc
ed 

                              through the mouth, and advanced to the duodenum and used to inject cont
rast 

                              into the bile duct.

Findings:                     Selective cbd cannulation with a wire was achieved. Cholangiogram showe
d a 7 

                              mm cbd stone. Contrast was injected. I personally interpreted the bile 
duct 

                              images. A 13 mm biliary sphincterotomy was made with a sphincterotome. 
There 

                              was no post-sphincterotomy bleeding. The stone was then crushed with a 


                              basket, and removed with a 13.5 mm balloon. Post balloon-occlusive 

                              cholangiogram was normal.

Estimated Blood Loss:         none.

Post Op Diagnosis:            - cbd stone, as above, removed.

Recommendation:               - feed

                              - no LFTs tomorrow (falsely goes up from edema from ERCP); rather, will
 

                              recheck LFTs in about three weeks, make sure they normalize, as I suspe
ct.

                              Thank you for allowing me to help in the management of this patient.

Attending Participation:

     I personally performed the entire procedure.

 

Indira Reeves MD

______________

Leandro Jacobson MD

12/25/2018 2:50:39 PM

This report has been signed electronicallyPeter MD Indira

Number of Addenda: 0

 

Note Initiated On: 12/25/2018 12:19 PM

[email or web address]

## 2018-12-25 NOTE — HOSPPROG
Hospitalist Progress Note


Assessment/Plan: 





DIAGNOSES: 


*  Acute Sepsis with increased fevers and hypotension, 


*  Enterococcal Bacteremia with growth in all bottles; vancomycin resistance 

per BCID with final sensitivities pending


   -antibiotics include daptomycin, Zosyn


   -chart mentions history of recent endocarditis, but no specific details


*  Retained CBD stone s/p failed ERCP


   -subsequent IR transhepatic guided wire with successful removal of a stone 

by ERCP


   -possible 2nd distal bile duct stone noted on CT scan 12/24


*  acute cholecystitis is suspected with increasing right o'clock upper 

quadrant pain and tenderness.


*  Toxic/metabolic encephalopathy due to anesthesia + infection


   -resolved at this time


*  Gastroparesis


   -due to narcotics


*  h/o TBI with cognitive dysfunction


*  history of Heroin abuse 


   -Suboxone


*  Etoh abuse


   -no evidence for withdrawal


*  Bipolar/PTSD


   -seroquel + trazodone


*  Tobacco dependence


   -patch


*  Hep C


   -HIV negative





Blood pressures are more stable today but still having significant pain and 

tenderness in abdomen, question if due to another stone in the bile duct verses 

cholecystitis/cholangitis verses potential perforation from percutaneous wire





PLANS:


* Is scheduled for repeat ERCP today, will review with Dr. Jacobson after that is 

done


* Continue current antibiotics, await final blood culture sensitivities


* Repeat blood cultures to surveil for clearance ordered for today


* Check for lab results as they become available


* Follow vital signs very closely


* Continue Suboxone, careful pain management


* Watch for withdrawal from any of his substances


* Appears to be in need of having his gallbladder removed, will review with 

surgery and Infectious Disease regarding timing





I have reviewed with Dr Cruz today in ICU





SUBJECTIVE:


Still with pain and right upper quadrant he believes about the same as yesterday


Nauseous but not vomiting


No new difficulties overnight, no shortness of breath





OBJECTIVE


Vitals reviewed:  Afebrile overnight, blood pressure is better overnight and 

this morning, still intermittent tachycardia


Cardiac Monitor, my review:  Sinus





Exam:


alert oriented good mentation, uncomfortable but not in distress


skin warm dry color ok, no jaundice


resps not labored


lungs clear BSs


heart regular


abd soft, bowel sounds present, less distended with significant tenderness with 

guarding in the right upper quadrant and right flank, no signs of peritonitis


limbs warm, no edema





iv site ok





Laboratory data:


Today's labs pending so far with CBC and liver panel ordered





Microbiology:


Vancomycin resistant Enterococcus growing in blood cultures by BCID, all bottles











Objective: 


 Vital Signs











Temp Pulse Resp BP Pulse Ox


 


 36.8 C   103 H  14   119/75   91 L


 


 12/25/18 08:00  12/25/18 08:00  12/25/18 08:00  12/25/18 08:00  12/25/18 08:00








 Microbiology











 12/23/18 06:10 Blood Panel (PCR) - Final





 Blood    Enterococcus Species


 


 12/23/18 06:30 Blood Panel (PCR) - Final





 Blood 








 Laboratory Results





 12/24/18 04:35 





 12/24/18 04:35 





 











 12/24/18 12/25/18 12/26/18





 06:59 06:59 06:59


 


Intake Total 4919 3362 


 


Balance 4919 3362 








 











PT  12.4 SEC (12.0-15.0)   12/20/18  05:35    


 


INR  0.90  (0.83-1.16)   12/20/18  05:35    














- Time Spent With Patient


Time Spent with Patient: greater than 35 minutes


Time Spent with Patient: Greater than 35 minutes spent on this patients care, 

greater than 50% of time spent counseling, educating, and coordinating care 

regarding the above mentioned plan.





ICD10 Worksheet


Patient Problems: 


 Problems











Problem Status Onset


 


Common bile duct mass Acute  


 


Alcohol dependence Acute  


 


Alcoholic intoxication Acute  


 


Dehydration Acute  


 


Delirium tremens Acute  


 


Head injury Acute  


 


Heroin abuse Acute  


 


Hypokalemia Acute  


 


Hypomagnesemia Acute  


 


Pneumonia Acute  


 


Respiratory failure Acute  


 


Seizure Acute

## 2018-12-25 NOTE — PDANEPAE
ANE Past Medical History





- Cardiovascular History


Hx Hypertension: No


Hx Arrhythmias: Yes


Hx Chest Pain: No


Hx Coronary Artery / Peripheral Vascular Disease: No


Cardiovascular History Comment: irregular heart beat on propranolol until a few 

yrs ago when pt stopped taking it.





- Pulmonary History


Hx COPD: No


Hx Asthma/Reactive Airway Disease: No


Hx Oxygen in Use at Home: No


Hx Sleep Apnea: Yes


Sleep Apnea Screening Result - Last Documented: Positive


Pulmonary History Comment: Pt denies any JOSH, but does report insomnia.





- Endocrine History


Hx Diabetes: No





- Renal History


Hx Renal Disorders: No





- Liver History


Hx Hepatic Disorders: Yes


Hepatic History Comment: hepatitis C





- Neurological & Psychiatric Hx


Hx Neurological and Psychiatric Disorders: Yes


Neurological / Psychiatric History Comment: bipolar d/o, h/o heroine use





- Cancer History


Hx Cancer: No





- GI History


Hx Gastrointestinal Disorders: No





ANE Review of Systems


Review of Systems: 








ANE Patient History





- Allergies


Allergies/Adverse Reactions: 








No Known Allergies Allergy (Verified 12/17/18 00:56)


 








- Home Medications


Home Medications: 








Buprenorphine HCl/Naloxone HCl [Suboxone 8 mg-2 mg Sl Film] 1 each SL DAILY 12/ 12/18 [Last Taken Unknown]


QUEtiapine FUMARATE [Seroquel 200 mg (*)] 200 - 400 mg PO DAILY 12/12/18 [Last 

Taken Unknown]


traZODone [traZODONE 100MG (*)] 200 mg PO HS 12/12/18 [Last Taken Unknown]


Gabapentin [Neurontin 300 MG (*)] 300 mg PO QID 12/17/18 [Last Taken Unknown]


Herbals/Supplements -Info Only 1 ea PO DAILY 12/17/18 [Last Taken Unknown]


Multivitamins [Multivitamin (*)] 1 each PO DAILY 12/17/18 [Last Taken Unknown]








- NPO status


NPO Since - Liquids (Date): 12/22/18


NPO Since - Liquids (Time): 00:00


NPO Since - Solids (Date): 12/22/18


NPO Since - Solids (Time): 00:00





- Smoking Hx


Smoking Status: Current every day smoker





- Alcohol Use


Alcohol Use: Heavy (Five days previously heavy use of liquor. Drinks 1 pint of 

hard liquor daily.)





ANE Labs/Vital Signs





- Labs


Result Diagrams: 


 12/25/18 11:00





 12/24/18 04:35





- Vital Signs


Blood Pressure: 104/58


Heart Rate: 106


Respiratory Rate: 15


O2 Sat (%): 97


Height: 172.72 cm


Weight: 58.06 kg





ANE Physical Exam





- Airway


Neck exam: decreased ROM


Mallampati Score: Class 2


Mouth exam: poor dentition





- Pulmonary


Pulmonary: no respiratory distress





- Cardiovascular


Cardiovascular: regular rate and rhythym





- ASA Status


ASA Status: III, E





ANE Anesthesia Plan


Anesthesia Plan: general endotracheal anesthesia

## 2018-12-26 LAB — CK SERPL-CCNC: < 20 IU/L (ref 0–224)

## 2018-12-26 RX ADMIN — ENOXAPARIN SODIUM SCH MG: 100 INJECTION SUBCUTANEOUS at 12:47

## 2018-12-26 RX ADMIN — TAZOBACTAM SODIUM AND PIPERACILLIN SODIUM SCH MLS: 375; 3 INJECTION, SOLUTION INTRAVENOUS at 11:12

## 2018-12-26 RX ADMIN — DAPTOMYCIN SCH MLS: 500 INJECTION, POWDER, LYOPHILIZED, FOR SOLUTION INTRAVENOUS at 19:48

## 2018-12-26 RX ADMIN — DOCUSATE SODIUM AND SENNOSIDES SCH TAB: 50; 8.6 TABLET ORAL at 20:10

## 2018-12-26 RX ADMIN — TAZOBACTAM SODIUM AND PIPERACILLIN SODIUM SCH MLS: 375; 3 INJECTION, SOLUTION INTRAVENOUS at 05:03

## 2018-12-26 RX ADMIN — MULTIPLE VITAMINS W/ MINERALS TAB SCH EACH: TAB at 08:29

## 2018-12-26 RX ADMIN — FOLIC ACID SCH MG: 1 TABLET ORAL at 08:28

## 2018-12-26 RX ADMIN — GABAPENTIN SCH MG: 300 CAPSULE ORAL at 11:12

## 2018-12-26 RX ADMIN — GABAPENTIN SCH MG: 300 CAPSULE ORAL at 16:19

## 2018-12-26 RX ADMIN — TAZOBACTAM SODIUM AND PIPERACILLIN SODIUM SCH MLS: 375; 3 INJECTION, SOLUTION INTRAVENOUS at 17:14

## 2018-12-26 RX ADMIN — NICOTINE SCH MG: 14 PATCH TRANSDERMAL at 08:32

## 2018-12-26 RX ADMIN — DOCUSATE SODIUM AND SENNOSIDES SCH TAB: 50; 8.6 TABLET ORAL at 08:28

## 2018-12-26 RX ADMIN — OXYCODONE HYDROCHLORIDE PRN MG: 15 TABLET ORAL at 23:34

## 2018-12-26 RX ADMIN — Medication SCH MG: at 08:29

## 2018-12-26 RX ADMIN — GABAPENTIN SCH MG: 300 CAPSULE ORAL at 06:04

## 2018-12-26 RX ADMIN — GABAPENTIN SCH: 300 CAPSULE ORAL at 05:58

## 2018-12-26 RX ADMIN — TAZOBACTAM SODIUM AND PIPERACILLIN SODIUM SCH MLS: 375; 3 INJECTION, SOLUTION INTRAVENOUS at 23:23

## 2018-12-26 RX ADMIN — TAZOBACTAM SODIUM AND PIPERACILLIN SODIUM SCH MLS: 375; 3 INJECTION, SOLUTION INTRAVENOUS at 01:04

## 2018-12-26 RX ADMIN — OXYCODONE HYDROCHLORIDE PRN MG: 15 TABLET ORAL at 08:34

## 2018-12-26 RX ADMIN — METHOCARBAMOL PRN MG: 750 TABLET ORAL at 08:28

## 2018-12-26 RX ADMIN — GABAPENTIN SCH MG: 300 CAPSULE ORAL at 20:11

## 2018-12-26 RX ADMIN — OXYCODONE HYDROCHLORIDE PRN MG: 15 TABLET ORAL at 20:20

## 2018-12-26 NOTE — PDINTPN
Intensivist Progress Note


Assessment/Plan: 





ASSESSMENT


59 yo male with polysubstance abuse (IV heroin, etoh) admitted with abdominal 

pain and found to have ascending cholangitis and severe sepsis due to 

obstructing biliary stone status post ERCP x 2 and transhepatic cholangiogram 

clinically improving





# ascending cholangitis, resolved


# enterococcal bacteremia. TTE on 12/24/18 without e/o endocarditis


# severe sepsis, resolved


# obstructive biliary stone s/p ERCP x 2


# bilateral pleural effusions, suspect sympathetic given abdominal pathology 

with a component of hypervolemia. 


# hypotension


# history of culture negative endocarditis


# polysubstance abuse. IV heroin and etoh. No e/o w/d at this point


# bipolar mood d/o


# hypervolemia





PLAN


#continue zosyn and dapto per ID


# agree with diuresis


# follow pleural effusions. Repeat CXR in 1-2 days, if effusions, worsening 

despite diuresis, may need thoracentesis. 


# will likely need cholecystectomy as outpatient


# multivitamin/thiamine and folic acid given etoh dependence


# seroquel and trazodone for bipolar mood disorder


# buprenorphine for heroin addiction


# Feeding - advanced as tolerated


# Analgesia APAP, buprenorphine


# Sedation none


# Thromboprophylaxis - heparin


# Head of bed elevated


# Ulcer prophylaxis - NA


# Glucose SSI


# Skin no skin breakdown


# Delirium - delirium precautions





CX Data


12/23/18 BCx enterococcus





EVENTS


12/23/18 attempted ERCP followed by cholangiogram and wire placement by IR





IMAGING


Personally reviewed interpreted images well as formal radiology reads. 


12/24/18 CT abdomen. no abscess. Residual 7 mm stone vs clot. Mild to moderate 

pleural effusions. Diffuse mesentery edema


12/26/18 CXR with small bilateral effusions





Subjective: 





Repeat ERCP yesterday successful with removal of billiary stone. Hypoxemic 

while sleeping. Lasix and TTE ordered this AM. Still complaining of mild neck 

and back pain. Improving abdominal pain. No fevers, chills, nausea, vomiting. 


Objective: 





 Vital Signs











Temp Pulse Resp BP Pulse Ox


 


 36.6 C   82   12   106/68   94 


 


 12/26/18 08:27  12/26/18 08:27  12/26/18 08:27  12/26/18 08:27  12/26/18 08:27








 Microbiology











 12/23/18 06:30 Blood Culture - Final





 Blood    Enterococcus Faecium Vre





 Blood Panel (PCR) - Final


 


 12/23/18 06:10 Blood Culture - Final





 Blood    Enterococcus Faecium Vre





 Blood Panel (PCR) - Final





    Enterococcus Species








 Laboratory Results





 12/25/18 11:00 





 12/24/18 04:35 





 











 12/25/18 12/26/18 12/27/18





 05:59 05:59 05:59


 


Intake Total 3362 2535 


 


Output Total  350 


 


Balance 3362 2185 








 











PT  12.4 SEC (12.0-15.0)   12/20/18  05:35    


 


INR  0.90  (0.83-1.16)   12/20/18  05:35    














Physical Exam





- Physical Exam


General Appearance: alert


EENT: PERRL/EOMI, normal ENT inspection


Neck: non-tender, full range of motion


Respiratory: chest non-tender, lungs clear, normal breath sounds


Cardiac/Chest: normal peripheral pulses, regular rate, rhythm, edema


Abdomen: other (Soft, mild tenderness to palpation)


Back: Normal inspection


Skin: normal color, warm/dry


Extremities: normal range of motion, non-tender, normal inspection


Neuro/Psych: no motor/sensory deficits, alert, normal mood/affect, oriented x 3





ICD10 Worksheet


Patient Problems: 


 Problems











Problem Status Onset


 


Common bile duct mass Acute  


 


Alcohol dependence Acute  


 


Alcoholic intoxication Acute  


 


Dehydration Acute  


 


Delirium tremens Acute  


 


Head injury Acute  


 


Heroin abuse Acute  


 


Hypokalemia Acute  


 


Hypomagnesemia Acute  


 


Pneumonia Acute  


 


Respiratory failure Acute  


 


Seizure Acute
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
## 2018-12-26 NOTE — ASMTCMCOM
CM Note

 

CM Note                       

Notes:

3rd attempt at ERCP-stone removal. Patient polysubstance 

abuse: heroin, ETOH, Bipolar, Homeless. Will need a Shelter bed and [facility] on discharge. May 


transfer to floor today.

 

Date Signed:  12/26/2018 02:16 PM

Electronically Signed By:Julia Goldman LCSW

## 2018-12-26 NOTE — SOAPPROG
SOAP Progress Note


Assessment/Plan: 





Assessment/Plan:





CBD stone, now removed.  Doing better, with less pain, normotensive, afebrile, 

etc.  Some neck pain, and being evaluated for an infectious cause, but suspect 

might be musculoskeletal only due to awkward position required for ERCP (he had 

three of them!).





- he will require lap isac at some point





I will sign off;  I'll arrange repeat LFTs in about three weeks, to make sure 

they remain normal, as I suspect.  Otherwise, please call if we can be of 

further help [phone]).





12/26/18 12:38





Subjective: 





cc:  cbd stone





RUQ pain less.  Good po intake.  Denies rigors, chills, sweats.  Neck pain.


Objective: 





 Vital Signs











Temp Pulse Resp BP Pulse Ox


 


 36.6 C   80   12   103/75   97 


 


 12/26/18 08:27  12/26/18 12:00  12/26/18 12:00  12/26/18 12:00  12/26/18 12:00








 Microbiology











 12/23/18 06:30 Blood Culture - Final





 Blood    Enterococcus Faecium Vre





 Blood Panel (PCR) - Final


 


 12/23/18 06:10 Blood Culture - Final





 Blood    Enterococcus Faecium Vre





 Blood Panel (PCR) - Final





    Enterococcus Species








 Laboratory Results





 12/25/18 11:00 





 12/24/18 04:35 





 











 12/25/18 12/26/18 12/27/18





 05:59 05:59 05:59


 


Intake Total 3362 2535 


 


Output Total  350 650


 


Balance 3362 2185 -650








 











PT  12.4 SEC (12.0-15.0)   12/20/18  05:35    


 


INR  0.90  (0.83-1.16)   12/20/18  05:35    














Physical Exam





- Physical Exam


General Appearance: WD/WN, alert, no apparent distress


EENT: PERRL/EOMI, normal ENT inspection, pharynx normal, TMs normal


Neck: non-tender, full range of motion, supple, normal inspection


Respiratory: chest non-tender, lungs clear, normal breath sounds


Cardiac/Chest: normal peripheral pulses, regular rate, rhythm


Peripheral Pulses: 2+: carotid (R), carotid (L), femoral (R), femoral (L), 

dorsalis-pedis (R), dorsalis-pedis (L)


Abdomen: normal bowel sounds, soft, No non-tender (some ruq tenderness, but less

)


Male Genitalia: deferred


Rectal: deferred


Back: Normal inspection


Skin: normal color, warm/dry


Lymphatic: no adenopathy


Extremities: normal range of motion, non-tender, normal inspection, normal 

capillary refill


Neuro/Psych: no motor/sensory deficits, alert, normal mood/affect, oriented x 3





ICD10 Worksheet


Patient Problems: 


 Problems











Problem Status Onset


 


Common bile duct mass Acute  


 


Alcohol dependence Acute  


 


Alcoholic intoxication Acute  


 


Dehydration Acute  


 


Delirium tremens Acute  


 


Head injury Acute  


 


Heroin abuse Acute  


 


Hypokalemia Acute  


 


Hypomagnesemia Acute  


 


Pneumonia Acute  


 


Respiratory failure Acute  


 


Seizure Acute

## 2018-12-26 NOTE — HOSPPROG
Hospitalist Progress Note


Assessment/Plan: 





DIAGNOSES: 


*  new onset of hypoxemia, pulmonary edema volume overload today in the setting 

of acute illness with IV fluids


   -will need evaluation for possible CHF


*  Acute Sepsis with increased fevers and hypotension due to biliary disease


*  Enterococcal Bacteremia with growth in all bottles; vancomycin resistance 

per BCID with final sensitivities pending


   -antibiotics include daptomycin, Zosyn


*  Retained CBD stone s/p failed ERCP


   -subsequent IR transhepatic guided wire with successful removal of a common 

duct stone by ERCP 12/20


   -2nd common bile duct stone removed by repeat ERCP 12/25


*  cholecystitis noted on CT scan


*  Toxic/metabolic encephalopathy due to anesthesia + infection


   -resolved at this time


*  Gastroparesis


   -due to narcotics


*  h/o TBI with cognitive dysfunction


*  history of Heroin abuse 


   -Suboxone


*  current methadone use in the outpatient setting, along with marijuana


*  Etoh abuse


   -no evidence for withdrawal


*  Bipolar/PTSD


   -seroquel + trazodone


*  Tobacco dependence


   -patch


*  Hep C


   -HIV negative





Blood pressures are more stable today but still having significant pain and 

tenderness in abdomen, question if due to another stone in the bile duct verses 

cholecystitis/cholangitis verses potential perforation from percutaneous wire





PLANS:


* Echocardiogram is ordered for today


* IV Lasix ordered at this time


* Continue current antibiotics, await final blood culture sensitivities


* Follow for results of repeat blood cultures


* Repeat CBC and metabolic panel tomorrow


* Follow vital signs very closely


* Continue Suboxone, careful pain management


* Appears to be in need of having his gallbladder removed, but this can be 

delayed as long as he is recovering








SUBJECTIVE:


With abdominal pain today, eating okay, no of nausea


Some increased cough, some shortness of breath


No other new symptoms





OBJECTIVE


Vitals reviewed:  Afebrile overnight, some mildly low blood pressures overnight 

that have resolved spontaneously, blood pressure is good now, normal pulse and 

respirations


Cardiac Monitor, my review:  Sinus


Oxygen:  His oxygen requirement is up to 4 L to 5 L this morning





Exam:


alert oriented good mentation, uncomfortable but not in distress


skin warm dry color ok, no jaundice


resps not labored


Jugular venous distention is noted


lungs very diminished breath sounds, no audible rales or wheeze


heart regular, very quiet heart tones but no audible murmur or gallop


abd soft, bowel sounds present, less distended less tender today


limbs warm, 1+ bilateral ankle edema





iv site ok





Microbiology:


Vancomycin resistant Enterococcus growing in initial blood cultures, all bottles


Repeat surveillance blood cultures 12/25 no growth so far





I reviewed chest x-ray image from this morning which shows evidence of some 

pulmonary edema and pleural effusion





Objective: 


 Vital Signs











Temp Pulse Resp BP Pulse Ox


 


 36.6 C   82   12   106/68   94 


 


 12/26/18 08:27  12/26/18 08:27  12/26/18 08:27  12/26/18 08:27  12/26/18 08:27








 Microbiology











 12/23/18 06:30 Blood Culture - Final





 Blood    Enterococcus Faecium Vre





 Blood Panel (PCR) - Final


 


 12/23/18 06:10 Blood Culture - Final





 Blood    Enterococcus Faecium Vre





 Blood Panel (PCR) - Final





    Enterococcus Species








 Laboratory Results





 12/25/18 11:00 





 12/24/18 04:35 





 











 12/25/18 12/26/18 12/27/18





 06:59 06:59 06:59


 


Intake Total 3362 2535 


 


Output Total  350 


 


Balance 3362 2185 








 











PT  12.4 SEC (12.0-15.0)   12/20/18  05:35    


 


INR  0.90  (0.83-1.16)   12/20/18  05:35    














- Time Spent With Patient


Time Spent with Patient: greater than 35 minutes


Time Spent with Patient: Greater than 35 minutes spent on this patients care, 

greater than 50% of time spent counseling, educating, and coordinating care 

regarding the above mentioned plan.





ICD10 Worksheet


Patient Problems: 


 Problems











Problem Status Onset


 


Common bile duct mass Acute  


 


Alcohol dependence Acute  


 


Alcoholic intoxication Acute  


 


Dehydration Acute  


 


Delirium tremens Acute  


 


Head injury Acute  


 


Heroin abuse Acute  


 


Hypokalemia Acute  


 


Hypomagnesemia Acute  


 


Pneumonia Acute  


 


Respiratory failure Acute  


 


Seizure Acute

## 2018-12-27 LAB
HAV AB SERPL IA-ACNC: POSITIVE
HEPATITIS A ANTIBODY IGM (BCH): NEGATIVE
HEPATITIS B CORE AB IGM: NEGATIVE
HEPATITIS B CORE AB TOTAL: REACTIVE
HEPATITIS B SURFACE ANTIGEN: NEGATIVE

## 2018-12-27 RX ADMIN — OXYCODONE HYDROCHLORIDE PRN MG: 15 TABLET ORAL at 03:00

## 2018-12-27 RX ADMIN — DOCUSATE SODIUM AND SENNOSIDES SCH TAB: 50; 8.6 TABLET ORAL at 21:45

## 2018-12-27 RX ADMIN — TAZOBACTAM SODIUM AND PIPERACILLIN SODIUM SCH MLS: 375; 3 INJECTION, SOLUTION INTRAVENOUS at 05:50

## 2018-12-27 RX ADMIN — GABAPENTIN SCH MG: 300 CAPSULE ORAL at 21:34

## 2018-12-27 RX ADMIN — METHOCARBAMOL PRN MG: 750 TABLET ORAL at 05:51

## 2018-12-27 RX ADMIN — FOLIC ACID SCH MG: 1 TABLET ORAL at 09:02

## 2018-12-27 RX ADMIN — OXYCODONE HYDROCHLORIDE PRN MG: 15 TABLET ORAL at 17:12

## 2018-12-27 RX ADMIN — NICOTINE SCH MG: 14 PATCH TRANSDERMAL at 09:03

## 2018-12-27 RX ADMIN — METHOCARBAMOL PRN MG: 750 TABLET ORAL at 17:13

## 2018-12-27 RX ADMIN — GABAPENTIN SCH MG: 300 CAPSULE ORAL at 16:03

## 2018-12-27 RX ADMIN — Medication SCH MG: at 09:02

## 2018-12-27 RX ADMIN — OXYCODONE HYDROCHLORIDE PRN MG: 15 TABLET ORAL at 21:34

## 2018-12-27 RX ADMIN — GABAPENTIN SCH MG: 300 CAPSULE ORAL at 05:51

## 2018-12-27 RX ADMIN — DOCUSATE SODIUM AND SENNOSIDES SCH TAB: 50; 8.6 TABLET ORAL at 09:01

## 2018-12-27 RX ADMIN — DAPTOMYCIN SCH MLS: 500 INJECTION, POWDER, LYOPHILIZED, FOR SOLUTION INTRAVENOUS at 21:34

## 2018-12-27 RX ADMIN — OXYCODONE HYDROCHLORIDE PRN MG: 15 TABLET ORAL at 05:51

## 2018-12-27 RX ADMIN — MULTIPLE VITAMINS W/ MINERALS TAB SCH EACH: TAB at 09:02

## 2018-12-27 RX ADMIN — GABAPENTIN SCH MG: 300 CAPSULE ORAL at 13:06

## 2018-12-27 RX ADMIN — ENOXAPARIN SODIUM SCH MG: 100 INJECTION SUBCUTANEOUS at 09:00

## 2018-12-27 NOTE — HOSPPROG
Hospitalist Progress Note


Assessment/Plan: 





DIAGNOSES: 


*  Acute Sepsis with increased fevers and hypotension due to biliary disease


*  Vancomycin Resistant Enterococcal Bacteremia with growth in all bottles


   -currently on Dapto


*  Retained CBD stone  


   -initially failed removal by ERCP


   -subsequent IR transhepatic guided wire rendezvous with successful removal 

of a common duct stone by ERCP 12/20


   -2nd common bile duct stone removed by repeat ERCP 12/25


*  cholecystitis noted on CT scan


*  hypoxemic resp failure, pulmonary edema by cxr but with normal echo


   -suspect all volume overload


   -diuresing


*  Toxic/metabolic encephalopathy due to anesthesia + infection, IVDA


   -improved overall but still a bit disoriented today


*  h/o TBI with chronic cognitive dysfunction


*  history of Heroin abuse 


   -Suboxone (unclear if opiates on drug screen in ER due to suboxone or if he 

has used heroin recently)


*  Methadone use in the outpatient setting, along with marijuana


*  Etoh abuse


   -no evidence for withdrawal here


*  Bipolar/PTSD


   -seroquel + trazodone


*  Tobacco dependence


   -patch


*  Hep C


   -HIV negative








PLANS:


* Continue current antibiotics thru Delvin 3 (all here in hospital due to IVDA per 

ID)


* Follow for results of repeat blood cultures


* I have ordered Repeat CBC and metabolic panel tomorrow


* will give one more dose of Lasix now, follow resps and O2 sats repeat CXR in 

am


* Hep A and B serologies ordered to determine if he is immune, consider 

vaccines if he is not


* Ongoing PT and OT


* Continue Suboxone, careful pain management


* ongoing substance abuse counseling


* He will need gallbladder removed, will review optimal timing with surgery.  

Given his overall picture I would favor doing during this admission if felt safe





reviewed in detail with Dr Prakash today





SUBJECTIVE:


still some RUQ pain, slowly improving


eating ok


no sob


very weak, not walking very well per his account





OBJECTIVE


Vitals reviewed:  Afebrile since 12/23, vitals otherwise nl


Cardiac Monitor, my review:  Sinus


Oxygen:  His oxygen requirement is down from 6L yest to 3L today





Exam:


alert, mildly disoriented today, appears comfortable


skin warm dry color ok, no jaundice


resps not labored


Jugular venous distention is noted


lungs very diminished breath sounds, no audible rales or wheeze


heart regular, very quiet heart tones but no audible murmur or gallop


abd soft, bowel sounds present, still mild RUQ tenderness


limbs warm, 1+ bilateral ankle edema





iv site ok





Microbiology:


Vancomycin resistant Enterococcus growing in initial blood cultures, all bottles


Repeat surveillance blood cultures 12/25 no growth so far still pending





I have reviewed images and radiology reports from yesterday's C spine MRI - 

there is severe degenerative change with central canal and foramenal stenoses, 

but there is no osteomyelitis, diskitis or abscess, no cord abnormalities





Objective: 


 Vital Signs











Temp Pulse Resp BP Pulse Ox


 


 36.9 C   80   16   133/89 H  96 


 


 12/27/18 08:00  12/27/18 08:00  12/27/18 08:00  12/27/18 08:00  12/27/18 08:00








 Laboratory Results





 12/25/18 11:00 





 12/24/18 04:35 





 











 12/26/18 12/27/18 12/28/18





 06:59 06:59 06:59


 


Intake Total 2535 1100 


 


Output Total 350 2175 550


 


Balance 2185 -1075 -550








 











PT  12.4 SEC (12.0-15.0)   12/20/18  05:35    


 


INR  0.90  (0.83-1.16)   12/20/18  05:35    














- Time Spent With Patient


Time Spent with Patient: greater than 35 minutes


Time Spent with Patient: Greater than 35 minutes spent on this patients care, 

greater than 50% of time spent counseling, educating, and coordinating care 

regarding the above mentioned plan.





ICD10 Worksheet


Patient Problems: 


 Problems











Problem Status Onset


 


Common bile duct mass Acute  


 


Alcohol dependence Acute  


 


Alcoholic intoxication Acute  


 


Dehydration Acute  


 


Delirium tremens Acute  


 


Head injury Acute  


 


Heroin abuse Acute  


 


Hypokalemia Acute  


 


Hypomagnesemia Acute  


 


Pneumonia Acute  


 


Respiratory failure Acute  


 


Seizure Acute

## 2018-12-27 NOTE — PCMIDPN
Assessment/Plan: 


1. VRE sepsis secondary to cholangitis with obstructing common bile duct stone 

status post extraction December 25:


Continue high-dose daptomycin (10 milligrams/kilogram) for vancomycin resistant 

Enterococcus faecium bacteremia.  CK is flat.  Blood cultures cleared quickly; 

TTE negative for evidence of valvular vegetations.  Will likely treat for 10 

days; stop date January 3rd.  Patient unfortunately needs to stay here for the 

duration of his treatment given homelessness, heroin use and other complicating 

factors.  Do not feel confident that he will take oral linezolid as an 

outpatient.  Discontinue Pipracil in/tazobactam.


2. Hepatitis-C antibody positive:


Obtain RNA.  Will also check hepatitis a and B serologies to ensure that he is 

immune to these entities.  There is a significant hepatitis a outbreak among 

the homeless presently and he is at risk for form and hepatitis a in the 

setting of concomitant hepatitis-C.  


3. Drug abuse:


Talked to him at length today about the deleterious effects of ongoing drug 

use.  The patient states he smokes heroin, as he tells me"I have got no veins 

left.".  HIV negative.  








Over 25 min spent with this patient today.





























Subjective: 


Starting to feel better.  Still has some neck pain.  Talked to him about his 

MRI results, which showed significant degenerative joint disease in this area.  

No evidence of infection.





Objective: 


Daptomycin 580 mg IV daily day 5


Zosyn 3.375 g IV q.6 hours day 4


Afebrile Vital Signs











 Temp Pulse Resp BP Pulse Ox


 


 36.9 C   80   16   133/89 H  96 


 


 12/27/18 08:00  12/27/18 08:00  12/27/18 08:00  12/27/18 08:00  12/27/18 08:00








 Laboratory Results





 12/25/18 11:00 





 12/24/18 04:35 





 











 12/26/18 12/27/18 12/28/18





 05:59 05:59 05:59


 


Intake Total 2535 1100 


 


Output Total 350 9466 550


 


Balance 2185 -1075 -550








Blood cultures December 25th no growth


Blood cultures December 23 VRE with vancomycin ELVIN greater than 16


Daptomycin ELVIN of 2


TTE negative


MRI of the C-spine with significant DJD





- Physical Exam


General Appearance: alert, no apparent distress, other (Disheveled)


EENT: No thrush


Respiratory: lungs clear


Cardiac/Chest: regular rate, rhythm, No systolic murmur


Extremities: other (PICC line right upper extremity looks fine)


Abdomen: non-tender, soft


Skin: No rash





ICD10 Worksheet


Patient Problems: 


 Problems











Problem Status Onset


 


Common bile duct mass Acute  


 


Alcohol dependence Acute  


 


Alcoholic intoxication Acute  


 


Dehydration Acute  


 


Delirium tremens Acute  


 


Head injury Acute  


 


Heroin abuse Acute  


 


Hypokalemia Acute  


 


Hypomagnesemia Acute  


 


Pneumonia Acute  


 


Respiratory failure Acute  


 


Seizure Acute

## 2018-12-28 LAB
CK SERPL-CCNC: < 20 IU/L (ref 0–224)
PLATELET # BLD: 171 10^3/UL (ref 150–400)

## 2018-12-28 RX ADMIN — FOLIC ACID SCH MG: 1 TABLET ORAL at 09:34

## 2018-12-28 RX ADMIN — MULTIPLE VITAMINS W/ MINERALS TAB SCH EACH: TAB at 09:32

## 2018-12-28 RX ADMIN — METHOCARBAMOL PRN MG: 750 TABLET ORAL at 15:13

## 2018-12-28 RX ADMIN — Medication SCH MG: at 09:32

## 2018-12-28 RX ADMIN — ACETAMINOPHEN PRN MG: 325 TABLET ORAL at 01:39

## 2018-12-28 RX ADMIN — OXYCODONE HYDROCHLORIDE PRN MG: 15 TABLET ORAL at 01:30

## 2018-12-28 RX ADMIN — METHOCARBAMOL PRN MG: 750 TABLET ORAL at 09:35

## 2018-12-28 RX ADMIN — DAPTOMYCIN SCH MLS: 500 INJECTION, POWDER, LYOPHILIZED, FOR SOLUTION INTRAVENOUS at 20:14

## 2018-12-28 RX ADMIN — NICOTINE SCH MG: 14 PATCH TRANSDERMAL at 09:30

## 2018-12-28 RX ADMIN — ENOXAPARIN SODIUM SCH MG: 100 INJECTION SUBCUTANEOUS at 09:35

## 2018-12-28 RX ADMIN — DOCUSATE SODIUM AND SENNOSIDES SCH TAB: 50; 8.6 TABLET ORAL at 20:17

## 2018-12-28 RX ADMIN — GABAPENTIN SCH MG: 300 CAPSULE ORAL at 20:16

## 2018-12-28 RX ADMIN — GABAPENTIN SCH MG: 300 CAPSULE ORAL at 15:13

## 2018-12-28 RX ADMIN — GABAPENTIN SCH MG: 300 CAPSULE ORAL at 04:32

## 2018-12-28 RX ADMIN — GABAPENTIN SCH MG: 300 CAPSULE ORAL at 18:13

## 2018-12-28 RX ADMIN — DOCUSATE SODIUM AND SENNOSIDES SCH TAB: 50; 8.6 TABLET ORAL at 09:34

## 2018-12-28 RX ADMIN — METHOCARBAMOL PRN MG: 750 TABLET ORAL at 01:30

## 2018-12-28 RX ADMIN — OXYCODONE HYDROCHLORIDE PRN MG: 15 TABLET ORAL at 04:31

## 2018-12-28 NOTE — CPEKG
Test Reason : OPEN

Blood Pressure : ***/*** mmHG

Vent. Rate : 058 BPM     Atrial Rate : 057 BPM

   P-R Int : 122 ms          QRS Dur : 108 ms

    QT Int : 424 ms       P-R-T Axes : 069 -71 -07 degrees

   QTc Int : 417 ms

 

Sinus rhythm

Left anterior fascicular block

Probable anterior infarct, age indeterminate

 

Confirmed by Derek Francisco ([id number]) on 12/28/2018 3:28:04 PM

 

Referred By:             Confirmed By:Derek Francisco

## 2018-12-28 NOTE — ASMTCMCOM
CM Note

 

CM Note                       

Notes:

Pt to continue receiving high dose dapto with stop date 01/03/19. Per pt request this CM left a 

voicemail with [facility] updating them on his inpatient status. Pt also provided the [employer] 

[employer] phone number as he has lost his card. 



Pt has [facility]/homeless resources. Pt d/c plan remains d/c to [facility], pt may requires 

[facility] f/u appt, Medicaid transport and potentially clothing. 



  

 

Date Signed:  12/28/2018 03:33 PM

Electronically Signed By:VENESSA Kay

## 2018-12-28 NOTE — PCMIDPN
Assessment/Plan: 


1. Tachycardia, with irregularly irregular rhythm:


Blood pressure stable.  Patient is more phlegmatic today, but denies chest pain 

or shortness of breath.  Neurologic exam is normal.  He denies injecting 

substances into his PICC line, or ingesting any illicit substances.  For now, 

will obtain stat EKG, stat CMP, TSH, CK, troponin, and urine tox screen.  May 

need CT with PE protocol, but will defer to Internal Medicine.  Have notified 

 of what's going on, and what I am doing.  He will see patient shortly 

and follow up on these tests.


1. VRE sepsis secondary to cholangitis with obstructing common bile duct stone 

status post extraction December 25:


Continue high-dose daptomycin (10 milligrams/kilogram) for vancomycin resistant 

Enterococcus faecium bacteremia.  CK is flat.  Blood cultures cleared quickly; 

TTE negative for evidence of valvular vegetations.  Will likely treat for 10 

days; stop date January 3rd.  Patient unfortunately needs to stay here for the 

duration of his treatment given homelessness, heroin use and other complicating 

factors.  Do not feel confident that he will take oral linezolid as an 

outpatient.


2. Hepatitis-C antibody positive:











Over 25 min spent with this patient today.





























12/28/18 13:05





Objective: 


Daptomycin 580 mg IV daily day 6, stop date January 3


No fevers, but heart rate in the 130s, blood pressure 150/80 Vital Signs











Temp Pulse Resp BP Pulse Ox


 


 36.2 C   72   16   169/122 H  92 


 


 12/28/18 08:48  12/28/18 08:48  12/28/18 08:48  12/28/18 08:48  12/28/18 08:48








 Laboratory Results





 12/28/18 04:54 





 12/28/18 04:54 





 











 12/27/18 12/28/18 12/29/18





 05:59 05:59 05:59


 


Intake Total 1100 750 240


 


Output Total 2175 550 


 


Balance -1075 200 240








 Laboratory Tests











  12/27/18 12/27/18





  12:30 12:30


 


Hepatitis A Ab Total  POSITIVE 


 


Hep Bs Antibody   Pending


 


HCV RNA (PCR) IUs/ml  Pending 








No new microbiology





- Physical Exam


General Appearance: other (Alert, but slow to respond to my questions.  Have to 

redirect him.)


EENT: No thrush


Respiratory: lungs clear


Cardiac/Chest: tachycardia, irregularly irregular


Extremities: other (PICC line right upper extremity.), No calf tenderness, No 

swelling


Abdomen: non-tender, soft


Skin: No rash, No embolic lesions


Neuro/Psych: no motor/sensory deficits, oriented x 3, No facial droop





ICD10 Worksheet


Patient Problems: 


 Problems











Problem Status Onset


 


Common bile duct mass Acute  


 


Alcohol dependence Acute  


 


Alcoholic intoxication Acute  


 


Dehydration Acute  


 


Delirium tremens Acute  


 


Head injury Acute  


 


Heroin abuse Acute  


 


Hypokalemia Acute  


 


Hypomagnesemia Acute  


 


Pneumonia Acute  


 


Respiratory failure Acute  


 


Seizure Acute

## 2018-12-28 NOTE — HOSPPROG
Hospitalist Progress Note


Assessment/Plan: 





DIAGNOSES: 


*  Acute Sepsis with increased fevers and hypotension due to biliary disease


   -sepsis resolved


*  Vancomycin Resistant Enterococcal Bacteremia with growth in all bottles; 

biliary source


   -currently on Dapto


*  Retained CBD stone  


   -initially failed removal by ERCP


   -subsequent IR transhepatic guided wire rendezvous with successful removal 

of a common duct stone by ERCP 12/20


   -2nd common bile duct stone removed by repeat ERCP 12/25 after persisting 

pain and fever and CT scan showing a new stone


*  cholecystitis noted on CT scan


*  hypoxemic resp failure, pulmonary edema by cxr but with normal echo


   -suspect all volume overload


   -has improved very nicely with diuresis so far


*  Toxic/metabolic encephalopathy due to anesthesia + infection, IVDA


   -improved overall but still a bit disoriented today


*  h/o TBI with chronic cognitive dysfunction


*  history of Heroin abuse 


   -Suboxone (unclear if opiates on drug screen in ER due to suboxone or if he 

has used heroin recently)


*  Methadone use in the outpatient setting, along with marijuana


*  Etoh abuse


   -no evidence for withdrawal here


*  Bipolar/PTSD/chronic anxiety


   -seroquel + trazodone, p.r.n. Ativan


*  Tobacco dependence, 


   -patch


*  Hep C


   -HIV negative


   -has evidence of prior exposure to hepatitis A and B, so vaccines not 

indicated








PLANS:


* Continue current antibiotics thru Delvin 3 (all here in hospital due to IVDA per 

ID)


* Follow for results of repeat blood cultures


* Hep A and B serologies ordered to determine if he is immune, consider 

vaccines if he is not


* Ongoing PT and OT


* Continue Suboxone, careful pain management


* ongoing substance abuse counseling


* He will need gallbladder removed, will review optimal timing with surgery.  

Given his overall picture I would favor doing during this admission if felt safe





reviewed in detail with Dr Parkash today





SUBJECTIVE:


still some RUQ pain, slowly improving


eating ok


no sob


very weak, not walking very well per his account


Complains of some anxiety which is chronic for him





OBJECTIVE


Vitals reviewed:  Afebrile since 12/23, vitals otherwise nl


Cardiac Monitor, my review:  Sinus


Oxygen:  O2 need further decreased to 2 L this evening





Exam:


alert, better oriented today, appears comfortable


skin warm dry color ok, no jaundice


resps not labored


Jugular venous distention is noted


lungs very diminished breath sounds, no audible rales or wheeze


heart regular, very quiet heart tones but no audible murmur or gallop


abd soft, bowel sounds present, still mild RUQ tenderness


limbs warm, 1+ bilateral ankle edema





iv site ok





Lab data:


Normal liver panel, electrolytes, renal function


Slight decrease in hemoglobin otherwise stable CBC





Microbiology:


Vancomycin resistant Enterococcus growing in initial blood cultures, all bottles


Repeat surveillance blood cultures 12/25 no growth so far still pending











Objective: 


 Vital Signs











Temp Pulse Resp BP Pulse Ox


 


 36.9 C   68   16   124/94 H  94 


 


 12/28/18 16:00  12/28/18 16:00  12/28/18 16:00  12/28/18 16:00  12/28/18 16:00








 Laboratory Results





 12/28/18 04:54 





 12/28/18 13:15 





 











 12/27/18 12/28/18 12/29/18





 06:59 06:59 06:59


 


Intake Total 1100 750 240


 


Output Total 2175 550 


 


Balance -1075 200 240








 











PT  12.4 SEC (12.0-15.0)   12/20/18  05:35    


 


INR  0.90  (0.83-1.16)   12/20/18  05:35    














- Time Spent With Patient


Time Spent with Patient: greater than 35 minutes


Time Spent with Patient: Greater than 35 minutes spent on this patients care, 

greater than 50% of time spent counseling, educating, and coordinating care 

regarding the above mentioned plan.





ICD10 Worksheet


Patient Problems: 


 Problems











Problem Status Onset


 


Common bile duct mass Acute  


 


Alcohol dependence Acute  


 


Alcoholic intoxication Acute  


 


Dehydration Acute  


 


Delirium tremens Acute  


 


Head injury Acute  


 


Heroin abuse Acute  


 


Hypokalemia Acute  


 


Hypomagnesemia Acute  


 


Pneumonia Acute  


 


Respiratory failure Acute  


 


Seizure Acute

## 2018-12-29 RX ADMIN — GABAPENTIN SCH MG: 300 CAPSULE ORAL at 05:36

## 2018-12-29 RX ADMIN — GABAPENTIN SCH MG: 300 CAPSULE ORAL at 16:26

## 2018-12-29 RX ADMIN — DOCUSATE SODIUM AND SENNOSIDES SCH TAB: 50; 8.6 TABLET ORAL at 09:27

## 2018-12-29 RX ADMIN — DOCUSATE SODIUM AND SENNOSIDES SCH TAB: 50; 8.6 TABLET ORAL at 20:20

## 2018-12-29 RX ADMIN — ACETAMINOPHEN PRN MG: 325 TABLET ORAL at 18:00

## 2018-12-29 RX ADMIN — KETOROLAC TROMETHAMINE PRN MG: 30 INJECTION, SOLUTION INTRAMUSCULAR at 16:27

## 2018-12-29 RX ADMIN — GABAPENTIN SCH MG: 300 CAPSULE ORAL at 20:20

## 2018-12-29 RX ADMIN — DAPTOMYCIN SCH MLS: 500 INJECTION, POWDER, LYOPHILIZED, FOR SOLUTION INTRAVENOUS at 20:20

## 2018-12-29 RX ADMIN — ENOXAPARIN SODIUM SCH MG: 100 INJECTION SUBCUTANEOUS at 09:28

## 2018-12-29 RX ADMIN — ACETAMINOPHEN PRN MG: 325 TABLET ORAL at 09:37

## 2018-12-29 RX ADMIN — Medication SCH MG: at 09:24

## 2018-12-29 RX ADMIN — GABAPENTIN SCH MG: 300 CAPSULE ORAL at 13:46

## 2018-12-29 RX ADMIN — METHOCARBAMOL PRN MG: 750 TABLET ORAL at 16:26

## 2018-12-29 RX ADMIN — MULTIPLE VITAMINS W/ MINERALS TAB SCH EACH: TAB at 09:24

## 2018-12-29 RX ADMIN — NICOTINE SCH MG: 14 PATCH TRANSDERMAL at 09:30

## 2018-12-29 RX ADMIN — FOLIC ACID SCH MG: 1 TABLET ORAL at 09:25

## 2018-12-29 RX ADMIN — ACETAMINOPHEN PRN MG: 325 TABLET ORAL at 13:57
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
## 2018-12-30 RX ADMIN — ACETAMINOPHEN PRN MG: 325 TABLET ORAL at 12:45

## 2018-12-30 RX ADMIN — METHOCARBAMOL PRN MG: 750 TABLET ORAL at 15:43

## 2018-12-30 RX ADMIN — FOLIC ACID SCH MG: 1 TABLET ORAL at 09:34

## 2018-12-30 RX ADMIN — MULTIPLE VITAMINS W/ MINERALS TAB SCH EACH: TAB at 09:34

## 2018-12-30 RX ADMIN — KETOROLAC TROMETHAMINE PRN MG: 30 INJECTION, SOLUTION INTRAMUSCULAR at 06:33

## 2018-12-30 RX ADMIN — DOCUSATE SODIUM AND SENNOSIDES SCH TAB: 50; 8.6 TABLET ORAL at 09:34

## 2018-12-30 RX ADMIN — DAPTOMYCIN SCH MLS: 500 INJECTION, POWDER, LYOPHILIZED, FOR SOLUTION INTRAVENOUS at 20:11

## 2018-12-30 RX ADMIN — ENOXAPARIN SODIUM SCH MG: 100 INJECTION SUBCUTANEOUS at 09:35

## 2018-12-30 RX ADMIN — GABAPENTIN SCH MG: 300 CAPSULE ORAL at 12:40

## 2018-12-30 RX ADMIN — Medication SCH MG: at 09:34

## 2018-12-30 RX ADMIN — DOCUSATE SODIUM AND SENNOSIDES SCH TAB: 50; 8.6 TABLET ORAL at 20:10

## 2018-12-30 RX ADMIN — GABAPENTIN SCH MG: 300 CAPSULE ORAL at 20:09

## 2018-12-30 RX ADMIN — NICOTINE SCH MG: 14 PATCH TRANSDERMAL at 09:35

## 2018-12-30 RX ADMIN — GABAPENTIN SCH MG: 300 CAPSULE ORAL at 06:33

## 2018-12-30 RX ADMIN — GABAPENTIN SCH MG: 300 CAPSULE ORAL at 15:43

## 2018-12-30 RX ADMIN — ACETAMINOPHEN PRN MG: 325 TABLET ORAL at 20:09

## 2018-12-30 NOTE — HOSPPROG
Hospitalist Progress Note


Assessment/Plan: 





*  Acute Sepsis with increased fevers and hypotension due to biliary disease


   -sepsis resolved


*  Vancomycin Resistant Enterococcal Bacteremia with growth in all bottles; 

biliary source


   -currently on Dapto - IV abc in house until Delvin 3


*  Retained CBD stone  


   -initially failed removal by ERCP


   -subsequent IR transhepatic guided wire rendezvous with successful removal 

of a common duct stone by ERCP 12/20


   -2nd common bile duct stone removed by repeat ERCP 12/25 after persisting 

pain and fever and CT scan showing a new stone


*  cholecystitis noted on CT scan


* will prob need lap isac before dc


*  hypoxemic resp failure, pulmonary edema by cxr but with normal echo


   -suspect all volume overload


   -has improved very nicely with diuresis so far


*  Toxic/metabolic encephalopathy due to anesthesia + infection, IVDA


   -improved overall but still a bit disoriented today


*  h/o TBI with chronic cognitive dysfunction


*  history of Heroin abuse 


   -Suboxone (unclear if opiates on drug screen in ER due to suboxone or if he 

has used heroin recently)


*  Methadone use in the outpatient setting, along with marijuana


*  Etoh abuse


   -no evidence for withdrawal here


*  Bipolar/PTSD/chronic anxiety


   -seroquel + trazodone, p.r.n. Ativan


*  Tobacco dependence, 


   -patch


*  Hep C


   -HIV negative


   -has evidence of prior exposure to hepatitis A and B, so vaccines not 

indicated


* chest pain


* from accident, reproducible


* add toradol











Subjective: no new complaints


Objective: 


 Vital Signs











Temp Pulse Resp BP Pulse Ox


 


 36.9 C   69   16   121/86 H  85 L


 


 12/30/18 08:00  12/30/18 08:00  12/30/18 08:00  12/30/18 08:00  12/30/18 08:00








 Laboratory Results





 12/28/18 04:54 





 12/28/18 13:15 





 











 12/29/18 12/30/18 12/31/18





 05:59 05:59 05:59


 


Intake Total 240 1050 


 


Output Total 500  


 


Balance -260 1050 








 











PT  12.4 SEC (12.0-15.0)   12/20/18  05:35    


 


INR  0.90  (0.83-1.16)   12/20/18  05:35    














- Physical Exam


Constitutional: no apparent distress, appears nourished, not in pain


Eyes: anicteric sclera, EOMI


Ears, Nose, Mouth, Throat: moist mucous membranes


Cardiovascular: regular rate and rhythym


Respiratory: no respiratory distress


Gastrointestinal: normoactive bowel sounds, soft, non-tender abdomen, no 

palpable masses


Neurologic: AAOx3


Psychiatric: interacting appropriately, not anxious, not encephalopathic, 

thought process linear





ICD10 Worksheet


Patient Problems: 


 Problems











Problem Status Onset


 


Common bile duct mass Acute  


 


Alcohol dependence Acute  


 


Alcoholic intoxication Acute  


 


Dehydration Acute  


 


Delirium tremens Acute  


 


Head injury Acute  


 


Heroin abuse Acute  


 


Hypokalemia Acute  


 


Hypomagnesemia Acute  


 


Pneumonia Acute  


 


Respiratory failure Acute  


 


Seizure Acute

## 2018-12-31 RX ADMIN — METHOCARBAMOL PRN MG: 750 TABLET ORAL at 19:29

## 2018-12-31 RX ADMIN — METHOCARBAMOL PRN MG: 750 TABLET ORAL at 11:59

## 2018-12-31 RX ADMIN — FOLIC ACID SCH MG: 1 TABLET ORAL at 08:54

## 2018-12-31 RX ADMIN — GABAPENTIN SCH MG: 300 CAPSULE ORAL at 05:22

## 2018-12-31 RX ADMIN — KETOROLAC TROMETHAMINE PRN MG: 30 INJECTION, SOLUTION INTRAMUSCULAR at 15:58

## 2018-12-31 RX ADMIN — GABAPENTIN SCH MG: 300 CAPSULE ORAL at 11:59

## 2018-12-31 RX ADMIN — ACETAMINOPHEN PRN MG: 325 TABLET ORAL at 19:28

## 2018-12-31 RX ADMIN — GABAPENTIN SCH MG: 300 CAPSULE ORAL at 19:29

## 2018-12-31 RX ADMIN — NICOTINE SCH MG: 14 PATCH TRANSDERMAL at 08:54

## 2018-12-31 RX ADMIN — DOCUSATE SODIUM AND SENNOSIDES SCH TAB: 50; 8.6 TABLET ORAL at 08:54

## 2018-12-31 RX ADMIN — ACETAMINOPHEN PRN MG: 325 TABLET ORAL at 05:22

## 2018-12-31 RX ADMIN — METHOCARBAMOL PRN MG: 750 TABLET ORAL at 05:22

## 2018-12-31 RX ADMIN — Medication SCH MG: at 08:54

## 2018-12-31 RX ADMIN — GABAPENTIN SCH MG: 300 CAPSULE ORAL at 15:57

## 2018-12-31 RX ADMIN — DAPTOMYCIN SCH MLS: 500 INJECTION, POWDER, LYOPHILIZED, FOR SOLUTION INTRAVENOUS at 19:31

## 2018-12-31 RX ADMIN — DOCUSATE SODIUM AND SENNOSIDES SCH TAB: 50; 8.6 TABLET ORAL at 19:29

## 2018-12-31 RX ADMIN — MULTIPLE VITAMINS W/ MINERALS TAB SCH EACH: TAB at 08:54

## 2018-12-31 RX ADMIN — ENOXAPARIN SODIUM SCH MG: 100 INJECTION SUBCUTANEOUS at 08:58

## 2018-12-31 NOTE — PDCONSULT
<Arabella Nava - Last Filed: 12/31/18 15:44>


Consultant Note: 





CONSULTING PROVIDER: Kianna Barry





CC: Abdominal pain, sepsis with biliary source





HISTORY OF PRESENTING ILLNESS: 58 year old homeless male, with significant 

history of alcohol and illicit drug use, who initially presented to the ED on 12 /17/18 with complaints of epigastric, right upper quadrant pain, and vomiting. 

He was subsequently admitted for treatment and evaluation of VRE sepsis with 

suspected cause by cholangitis and choledocholithiasis. VRE sepsis has resolved 

on current daptomycin treatment. He underwent 2 separate ERCP procedures during 

his hospitalization, with the last being on 12/25/18 for bile duct stone 

extraction. He is also being managed for pulmonary edema and toxic/metabolic 

encephalopathy, both of which have improved during his stay. He continues to 

have mild right upper quadrant pain. No nausea or emesis. Bowel movements have 

been normal for him. No recent fever or chills. No urinary complaints. No chest 

pain, palpitations, or shortness of breath.





SOCIAL: homeless, alcohol abuse, methadone use, marijuana use, heroin abuse





FAMILY HISTORY: noncontributory





PAST MEDICAL HISTORY: Alcohol abuse, Heroin abuse, head injury, pneumonia, 

respiratory failure, depression, opioid withdrawal, delirium tremens, seizures





CURRENT MEDICATIONS: Tylenol, Dulcolax, Naloxone, Daptomycin, Lovenox, 

Flumazenil, Folic acid, gabapentin, haloperidol, Toradol, Lactulose, Lorazepam, 

Milk of Mag, Methocarbamol, multivitamin, Nicoderm, Zofran, Miralax, Seroquel, 

Senokot, Thiamine, Trazodone





HOME MEDICATIONS: Seroquel, multivitamin, gabapentin, trazodone, naloxone 





ALLERGIES: NKDA





REVIEW OF SYSTEMS: A 10-point review of systems was reviewed and otherwise 

negative except stated in HPI above.





PHYSICAL EXAM: VITALS: 108/72, HR 72, 93% O2 sat room air, RR 16, Temp 36.8C. 

GEN: alert and oriented x3, appears comfortable, in no acute distress, 

afebrile. HEENT: Anicteric sclera. SKIN: normal, no jaundice. HEART: regular 

rate and rhythm. LUNGS: clear to auscultation bilaterally. ABDOMEN: soft, 

nondistended, mild right upper quadrant tenderness with palpation. NEUROLOGIC: 

nonfocal. EXTREMITIES: unremarkable. PSYCHIATRIC: appropriate during exam





Reviewed patient labs, imaging, and hospital reports. 





ASSESSMENT/PLAN: Choledocholithiasis with multiple common bile duct stone 

extractions. Agree with proceeding with laparoscopic cholecystectomy. Discussed 

risks and benefits of procedure to patient including but not limited to bleeding

, infection, retained bile duct stone, and bile duct injury. All questions 

addressed. Will schedule surgery for tomorrow am. Patient seen and evaluated by 

Dr. Torres.





<Ryan Torres - Last Filed: 12/31/18 17:02>


Consultant Note: 





Agree with above findings.  The patient is clinically stable for 

cholecystectomy.  The risks benefits and alternatives to the procedure been 

outlined clearly with the patient.  Consent will be obtained.  Perioperative 

antibiotics not necessary due to his current regimen.  May require open as post 

laparoscopic cholecystectomy from previous manipulation from ERCP and 

percutaneous transhepatic cholangiogram.  All questions addressed.  Anticipate 

2 day postoperative stay for pain control and convalescence

## 2018-12-31 NOTE — HOSPPROG
Hospitalist Progress Note


Assessment/Plan: 





58-year-old gentleman with a history of alcohol and heroin dependence last with 

last use 5 days ago, bipolar disorder with PTSD who presents emergency 

department who presents emergency department with sudden onset of epigastric/

right upper quadrant abdominal pain. First encounter, chart reviewed.





*  Acute Sepsis with increased fevers and hypotension due to biliary disease


-resolved


   


*  Vancomycin Resistant Enterococcal Bacteremia with growth in all bottles; 

biliary source


-daptomycin: CPK stable


-here in the hospital till January 3


   


*  Retained CBD stone  


-initially failed removal by ERCP


-subsequent IR transhepatic guided wire rendezvous with successful removal of a 

common duct stone by ERCP 12/20


-2nd common bile duct stone removed by repeat ERCP 12/25 after persisting pain 

and fever and CT scan showing a new stone





*  cholecystitis noted on CT scan


- needs lap isac before dc, will discuss w surgery today


-reviewed it w ID and it is fine to do this surgery anytime at this point





*  hypoxemic resp failure, pulmonary edema by cxr but with normal echo


-resolved w diuresis


   


*  Toxic/metabolic encephalopathy due to anesthesia + infection, IVDA


-resolved


   


*  h/o TBI with chronic cognitive dysfunction





*  history of Heroin abuse 


-Suboxone (unclear if opiates on drug screen in ER due to Suboxone or if he has 

used heroin recently)





*  Methadone use in the outpatient setting, along with marijuana





*  Etoh abuse


-no s/sx of withdrawal


   


*  Bipolar/PTSD/chronic anxiety


-seroquel + trazodone, p.r.n. Ativan





*  Tobacco dependence, 


   -patch





*  Hep C


-HIV negative


-has evidence of prior exposure to hepatitis A and B, so vaccines not indicated





* chest pain


-none further





*elevated TSH


-recheck in OP setting, not significantly high





*plan: get surgery involved to get his gallbladder out, patient very unlikely 

to get any f/u care.  I explained to Tomás that he needs his gallbladder out 

and he is fine w this.





Subjective: Tomás says he is cold this morning, eating well, has no complaints.


Objective: 


 Vital Signs











Temp Pulse Resp BP Pulse Ox


 


 36.6 C   64   16   114/75   90 L


 


 12/30/18 23:15  12/30/18 23:15  12/30/18 23:15  12/30/18 23:15  12/30/18 23:15








 Microbiology











 12/25/18 15:35 Blood Culture - Final





 Blood 


 


 12/25/18 11:00 Blood Culture - Final





 Blood 








 Laboratory Results





 12/28/18 04:54 





 12/28/18 13:15 





 











 12/30/18 12/31/18 01/01/19





 05:59 05:59 05:59


 


Intake Total 1050  


 


Balance 1050  








 











PT  12.4 SEC (12.0-15.0)   12/20/18  05:35    


 


INR  0.90  (0.83-1.16)   12/20/18  05:35    














- Physical Exam


Constitutional: chronically ill appearing, other (thin)


Eyes: PERRL


Ears, Nose, Mouth, Throat: hearing normal


Cardiovascular: regular rate and rhythym


Respiratory: no respiratory distress


Gastrointestinal: normoactive bowel sounds


Skin: warm


Musculoskeletal: full muscle strength


Psychiatric: interacting appropriately





ICD10 Worksheet


Patient Problems: 


 Problems











Problem Status Onset


 


Common bile duct mass Acute  


 


Alcohol dependence Acute  


 


Alcoholic intoxication Acute  


 


Dehydration Acute  


 


Delirium tremens Acute  


 


Head injury Acute  


 


Heroin abuse Acute  


 


Hypokalemia Acute  


 


Hypomagnesemia Acute  


 


Pneumonia Acute  


 


Respiratory failure Acute  


 


Seizure Acute

## 2019-01-01 PROCEDURE — 0FT44ZZ RESECTION OF GALLBLADDER, PERCUTANEOUS ENDOSCOPIC APPROACH: ICD-10-PCS | Performed by: SURGERY

## 2019-01-01 RX ADMIN — OXYCODONE HYDROCHLORIDE AND ACETAMINOPHEN PRN TAB: 5; 325 TABLET ORAL at 21:15

## 2019-01-01 RX ADMIN — DOCUSATE SODIUM AND SENNOSIDES SCH TAB: 50; 8.6 TABLET ORAL at 20:51

## 2019-01-01 RX ADMIN — Medication SCH MG: at 11:04

## 2019-01-01 RX ADMIN — NICOTINE SCH MG: 14 PATCH TRANSDERMAL at 11:06

## 2019-01-01 RX ADMIN — OXYCODONE HYDROCHLORIDE AND ACETAMINOPHEN PRN TAB: 5; 325 TABLET ORAL at 11:01

## 2019-01-01 RX ADMIN — DOCUSATE SODIUM AND SENNOSIDES SCH TAB: 50; 8.6 TABLET ORAL at 11:04

## 2019-01-01 RX ADMIN — FOLIC ACID SCH MG: 1 TABLET ORAL at 11:04

## 2019-01-01 RX ADMIN — MULTIPLE VITAMINS W/ MINERALS TAB SCH EACH: TAB at 11:03

## 2019-01-01 RX ADMIN — GABAPENTIN SCH: 300 CAPSULE ORAL at 00:10

## 2019-01-01 RX ADMIN — OXYCODONE HYDROCHLORIDE AND ACETAMINOPHEN PRN TAB: 5; 325 TABLET ORAL at 15:38

## 2019-01-01 RX ADMIN — DAPTOMYCIN SCH MLS: 500 INJECTION, POWDER, LYOPHILIZED, FOR SOLUTION INTRAVENOUS at 20:50

## 2019-01-01 RX ADMIN — GABAPENTIN SCH MG: 300 CAPSULE ORAL at 11:03

## 2019-01-01 RX ADMIN — GABAPENTIN SCH MG: 300 CAPSULE ORAL at 20:51

## 2019-01-01 RX ADMIN — GABAPENTIN SCH MG: 300 CAPSULE ORAL at 15:37

## 2019-01-01 RX ADMIN — ENOXAPARIN SODIUM SCH MG: 100 INJECTION SUBCUTANEOUS at 11:07

## 2019-01-01 NOTE — SOAPPROG
SOAP Progress Note


Assessment/Plan: 


Assessment/Plan:





No new concerns overnight.  Plan laparoscopic cholecystectomy today





all questions addressed.  Additional antibiotics perioperatively.








01/01/19 08:00





Objective: 





 Vital Signs











Temp Pulse Resp BP Pulse Ox


 


 36.2 C   72   14   131/87 H  92 


 


 01/01/19 07:40  01/01/19 07:55  01/01/19 07:55  01/01/19 07:55  01/01/19 07:55








 Microbiology











 12/25/18 15:35 Blood Culture - Final





 Blood 


 


 12/25/18 11:00 Blood Culture - Final





 Blood 








 Laboratory Results





 12/28/18 04:54 





 12/28/18 13:15 





 











PT  12.4 SEC (12.0-15.0)   12/20/18  05:35    


 


INR  0.90  (0.83-1.16)   12/20/18  05:35    














ICD10 Worksheet


Patient Problems: 


 Problems











Problem Status Onset


 


Common bile duct mass Acute  


 


Alcohol dependence Acute  


 


Alcoholic intoxication Acute  


 


Dehydration Acute  


 


Delirium tremens Acute  


 


Head injury Acute  


 


Heroin abuse Acute  


 


Hypokalemia Acute  


 


Hypomagnesemia Acute  


 


Pneumonia Acute  


 


Respiratory failure Acute  


 


Seizure Acute

## 2019-01-01 NOTE — POSTOPPROG
Post Op Note


Date of Operation: 01/01/19


Surgeon: Ryan Torres


Anesthesiologist: CALOS Moran


Anesthesia: GET(General Endotracheal)


Pre-op Diagnosis: Choledocholithiasis


Post-op Diagnosis: same


Procedure: Laparoscopic cholecystectomy


Inf/Abcess present in the surg proc area at time of surgery?: Yes


Depth: Organ Space


EBL: Minimal


Specimen(s): 





Gallbladder to permanent pathology

## 2019-01-01 NOTE — ASMTCMCOM
CM Note

 

CM Note                       

Notes:

Pt had lap isac. Plan remains pt will stay at hospital until IV antibiotics complete, 1/3/19. Pt 

has [facility] appt 01/3/19, may require Medicaid transport. 

 

Date Signed:  01/01/2019 11:38 AM

Electronically Signed By:VENESSA Kay

## 2019-01-01 NOTE — SUROPNOTE
[facility] Operative Report





- Surgery





Date of surgery:  1/1/2019





Indications:  This is a 58-year-old gentleman with known liver disease 

presenting with choledocholithiasis and VRE.  He has been on antibiotics and 

has had multiple procedures including several ERCPs and percutaneous 

transhepatic cholangiogram for the ability to access the duct in a duodenal 

diverticulum.  He has been apprised of the risks benefits and alternatives 

surgery and is a agree to a laparoscopic cholecystectomy.





Preop diagnosis:  Choledocholithiasis


Postop:  Same





Procedure laparoscopic cholecystectomy





Surgeon: Dr. Torres


Anesthesiologist:  Dr. Moran general endotracheal anesthesia





Specimen:  Gallbladder to permanent pathology


EBL 15 mL


Fluid given 1 L crystalloid





Procedure:


Patient was brought to the operating room and placed supine on the operating 

room table.  Time-out procedure was performed according his usual standards.  

Patient had general endotracheal anesthesia established abdomen is prepped 

chlorhexidine in a normal sterile fashion.  Local anesthetic was infused in 

skin and subcutaneous tissues of the trocar sites and open supraumbilical 

trocar placement was done in the standard fashion.  The abdomen is insufflated 

to 15 torr with carbon dioxide he has somewhat intrahepatic gallbladder.  There 

is no other pathology that is noted.  Working trocars were placed in the 

subxiphoid and right subcostal areas under direct visualization the gallbladder 

is and mobilized in the field of dissection and cystic artery and cystic duct 

are identified in the triangle of calot triply clipped and ligated.  The 

gallbladder was brought out through the umbilical incision.  The abdomen is 

inspected he has some of bleeding from all the trocar sites and skin puncture 

sites.  The liver bed is dry and hemostasis assured working trocars were 

removed.  Hemostasis is then on obtained at all the trocar sites fascia was 

reapproximated at the umbilical incision and all incisions are reapproximated 

the skin level using 4 Monocryl.  Dermabond was applied the patient was 

awakened extubated taken to recovery room stable condition no immediate 

complications.

## 2019-01-01 NOTE — PDANEPAE
ANE History of Present Illness





choledocholithiasis s/p ERCP x2





ANE Past Medical History





- Cardiovascular History


Hx Hypertension: No


Hx Arrhythmias: Yes


Hx Chest Pain: No


Hx Coronary Artery / Peripheral Vascular Disease: No


Cardiovascular History Comment: irregular heart beat on propranolol until a few 

yrs ago when pt stopped taking it.





- Pulmonary History


Hx COPD: No


Hx Asthma/Reactive Airway Disease: No


Hx Oxygen in Use at Home: No


Hx Sleep Apnea: Yes


Sleep Apnea Screening Result - Last Documented: Positive


Pulmonary History Comment: Pt denies any JOSH, but does report insomnia.





- Endocrine History


Hx Diabetes: No


Obesity: no





- Renal History


Hx Renal Disorders: No





- Liver History


Hx Hepatic Disorders: Yes


Hepatic History Comment: hepatitis C





- Neurological & Psychiatric Hx


Hx Neurological and Psychiatric Disorders: Yes


Neurological / Psychiatric History Comment: bipolar d/o, h/o heroine use





- Cancer History


Hx Cancer: No





- GI History


Hx Gastrointestinal Disorders: No





ANE Review of Systems


Review of systems is: negative


Review of Systems: 








ANE Patient History





- Allergies


Allergies/Adverse Reactions: 








No Known Allergies Allergy (Verified 12/17/18 00:56)


 








- Home Medications


Home medications: home medication list seen and reviewed


Home Medications: 








Buprenorphine HCl/Naloxone HCl [Suboxone 8 mg-2 mg Sl Film] 1 each SL DAILY 12/ 12/18 [Last Taken Unknown]


QUEtiapine FUMARATE [Seroquel 200 mg (*)] 200 - 400 mg PO DAILY 12/12/18 [Last 

Taken Unknown]


traZODone [traZODONE 100MG (*)] 200 mg PO HS 12/12/18 [Last Taken Unknown]


Gabapentin [Neurontin 300 MG (*)] 300 mg PO QID 12/17/18 [Last Taken Unknown]


Herbals/Supplements -Info Only 1 ea PO DAILY 12/17/18 [Last Taken Unknown]


Multivitamins [Multivitamin (*)] 1 each PO DAILY 12/17/18 [Last Taken Unknown]








- NPO status


NPO Since - Liquids (Date): 01/01/19


NPO Since - Liquids (Time): 00:00


NPO Since - Solids (Date): 01/01/19


NPO Since - Solids (Time): 00:00





- Anes Hx


Anes Hx: no prior problems





- Smoking Hx


Smoking Status: Current every day smoker





- Alcohol Use


Alcohol Use: Heavy (Five days previously heavy use of liquor. Drinks 1 pint of 

hard liquor daily.)





ANE Labs/Vital Signs





- Labs


Result Diagrams: 


 12/28/18 04:54





 12/28/18 13:15





- Vital Signs


Blood Pressure: 131/87


Heart Rate: 72


Respiratory Rate: 14


O2 Sat (%): 92


Height: 172.72 cm


Weight: 58.06 kg





ANE Physical Exam





- Airway


Neck exam: FROM


Mallampati Score: Class 1


Mouth exam: poor dentition





- Pulmonary


Pulmonary: no respiratory distress





- Cardiovascular


Cardiovascular: regular rate and rhythym





- ASA Status


ASA Status: III





ANE Anesthesia Plan


Anesthesia Plan: general endotracheal anesthesia

## 2019-01-01 NOTE — HOSPPROG
Hospitalist Progress Note


Assessment/Plan: 


58-year-old gentleman with a history of alcohol and heroin dependence last with 

last use 5 days ago, bipolar disorder with PTSD who presents to the emergency 

department with sudden onset of epigastric/right upper quadrant abdominal pain. 

First encounter, chart reviewed.





*  Acute Sepsis with increased fevers and hypotension due to biliary disease


-resolved


-cont dapto for VRE


   


*  Vancomycin Resistant Enterococcal Bacteremia with growth in all bottles; 

biliary source


-daptomycin: CPK stable


-here in the hospital till January 3


   


*  Retained CBD stone  - isac today





*  cholecystitis noted on CT scan- post op from cholecystectomy today. I 

discussed the case with his surgeon. patient can advance diet. 





*  hypoxemic resp failure, pulmonary edema by cxr but with normal echo


-resolved w diuresis


   


*  Toxic/metabolic encephalopathy due to anesthesia + infection, IVDA


-resolved


   


*  h/o TBI with chronic cognitive dysfunction





*  history of Heroin abuse 


-Suboxone (unclear if opiates on drug screen in ER due to Suboxone or if he has 

used heroin recently)





*  Methadone use in the outpatient setting, along with marijuana





*  Etoh abuse


-no s/sx of withdrawal


   


*  Bipolar/PTSD/chronic anxiety


-seroquel + trazodone, p.r.n. Ativan





*  Tobacco dependence, 


   -patch





*  Hep C


-HIV negative


-has evidence of prior exposure to hepatitis A and B, so vaccines not indicated





* chest pain


-none further





*elevated TSH


-recheck in OP setting, not significantly high





Plan to continue dapto until January 3rd. 





Subjective: patient having ab pain. otherwise tolerating PO well. no other 

complaints


Objective: 


 Vital Signs











Temp Pulse Resp BP Pulse Ox


 


 36.7 C   87   14   114/78   91 L


 


 01/01/19 12:20  01/01/19 12:20  01/01/19 12:20  01/01/19 12:20  01/01/19 12:20








 Laboratory Results





 12/28/18 04:54 





 12/28/18 13:15 





 











 12/31/18 01/01/19 01/02/19





 05:59 05:59 05:59


 


Intake Total   900


 


Balance   900








 











PT  12.4 SEC (12.0-15.0)   12/20/18  05:35    


 


INR  0.90  (0.83-1.16)   12/20/18  05:35    














- Physical Exam


Constitutional: no apparent distress, appears nourished, not in pain


Eyes: PERRL, anicteric sclera, EOMI


Ears, Nose, Mouth, Throat: moist mucous membranes, hearing normal, ears appear 

normal, no oral mucosal ulcers


Cardiovascular: regular rate and rhythym, no murmur, rub, or gallop


Respiratory: no respiratory distress, no rales or rhonchi, clear to auscultation


Gastrointestinal: other (laparascopic scars appear cdi. mildly tender to 

palpation in adb diffusely. hypoactive BS)


Genitourinary: no bladder fullness, no bladder tenderness, no renal bruits


Skin: no rashes or abrasions, no fluctuance, no induration


Musculoskeletal: full muscle strength, no muscle tenderness, normal joint ROM


Neurologic: AAOx3, sensation intact bilaterally


Psychiatric: interacting appropriately, not anxious, not encephalopathic, 

thought process linear


Lymph, Heme, Immunologic: no cervical LAD, no supraclavicular LAD





ICD10 Worksheet


Patient Problems: 


 Problems











Problem Status Onset


 


Common bile duct mass Acute  


 


Alcohol dependence Acute  


 


Alcoholic intoxication Acute  


 


Dehydration Acute  


 


Delirium tremens Acute  


 


Head injury Acute  


 


Heroin abuse Acute  


 


Hypokalemia Acute  


 


Hypomagnesemia Acute  


 


Pneumonia Acute  


 


Respiratory failure Acute  


 


Seizure Acute

## 2019-01-02 RX ADMIN — OXYCODONE HYDROCHLORIDE AND ACETAMINOPHEN PRN TAB: 5; 325 TABLET ORAL at 05:47

## 2019-01-02 RX ADMIN — ACETAMINOPHEN PRN MG: 325 TABLET ORAL at 17:35

## 2019-01-02 RX ADMIN — GABAPENTIN SCH MG: 300 CAPSULE ORAL at 05:47

## 2019-01-02 RX ADMIN — ACETAMINOPHEN PRN MG: 325 TABLET ORAL at 12:16

## 2019-01-02 RX ADMIN — MULTIPLE VITAMINS W/ MINERALS TAB SCH EACH: TAB at 08:24

## 2019-01-02 RX ADMIN — ENOXAPARIN SODIUM SCH MG: 100 INJECTION SUBCUTANEOUS at 08:25

## 2019-01-02 RX ADMIN — Medication SCH MG: at 08:24

## 2019-01-02 RX ADMIN — DOCUSATE SODIUM AND SENNOSIDES SCH TAB: 50; 8.6 TABLET ORAL at 08:23

## 2019-01-02 RX ADMIN — FOLIC ACID SCH MG: 1 TABLET ORAL at 08:24

## 2019-01-02 RX ADMIN — KETOROLAC TROMETHAMINE PRN MG: 30 INJECTION, SOLUTION INTRAMUSCULAR at 20:46

## 2019-01-02 RX ADMIN — DAPTOMYCIN SCH MLS: 500 INJECTION, POWDER, LYOPHILIZED, FOR SOLUTION INTRAVENOUS at 19:52

## 2019-01-02 RX ADMIN — GABAPENTIN SCH MG: 300 CAPSULE ORAL at 17:35

## 2019-01-02 RX ADMIN — NICOTINE SCH MG: 14 PATCH TRANSDERMAL at 08:30

## 2019-01-02 RX ADMIN — GABAPENTIN SCH MG: 300 CAPSULE ORAL at 12:16

## 2019-01-02 RX ADMIN — DOCUSATE SODIUM AND SENNOSIDES SCH TAB: 50; 8.6 TABLET ORAL at 20:06

## 2019-01-02 RX ADMIN — KETOROLAC TROMETHAMINE PRN MG: 30 INJECTION, SOLUTION INTRAMUSCULAR at 08:25

## 2019-01-02 RX ADMIN — GABAPENTIN SCH MG: 300 CAPSULE ORAL at 20:06

## 2019-01-02 RX ADMIN — KETOROLAC TROMETHAMINE PRN MG: 30 INJECTION, SOLUTION INTRAMUSCULAR at 14:37

## 2019-01-02 NOTE — SOAPPROG
SOAP Progress Note


Assessment/Plan: 


Assessment/Plan:


Doing very well s/p laparoscopic cholecystectomy. No clinical concern for 

infection. May eat regular diet as tolerated. Discussed no lifting >30lbs for 2 

weeks. Discussed expected healing course. Follow up in outpatient setting 1-2 

weeks. Patient is okay for discharge from a surgical standpoint. Patient case 

discussed with Dr. Torres. 





01/02/19 09:48





Subjective: 





s/p laparoscopic cholecystectomy POD #1 for choledocholithiasis and VRE. Doing 

very well. Eating breakfast during visit. Tolerating regular diet well. No 

nausea or emesis. Mild abdominal incisional pain and right upper quadrant 

soreness. Has not had BM since operation. No concerns.


Objective: 





 Vital Signs











Temp Pulse Resp BP Pulse Ox


 


 36.6 C   68   18   106/66   90 L


 


 01/02/19 07:34  01/02/19 07:34  01/02/19 07:34  01/02/19 07:34  01/02/19 07:34








 Laboratory Results





 12/28/18 04:54 





 12/28/18 13:15 





 











 01/01/19 01/02/19 01/03/19





 05:59 05:59 05:59


 


Intake Total  1450 


 


Balance  1450 








 











PT  12.4 SEC (12.0-15.0)   12/20/18  05:35    


 


INR  0.90  (0.83-1.16)   12/20/18  05:35    











Physical Exam:


   Gen: A&O x3, appears comfortable, afebrile


   HEENT: anicteric


   Skin: normal


   Abdomen: soft, distended, appropriate incisional tenderness. Incisions clean 

without erythema or drainage.


   Extremities: no edema


   





ICD10 Worksheet


Patient Problems: 


 Problems











Problem Status Onset


 


Common bile duct mass Acute  


 


Alcohol dependence Acute  


 


Alcoholic intoxication Acute  


 


Dehydration Acute  


 


Delirium tremens Acute  


 


Head injury Acute  


 


Heroin abuse Acute  


 


Hypokalemia Acute  


 


Hypomagnesemia Acute  


 


Pneumonia Acute  


 


Respiratory failure Acute  


 


Seizure Acute

## 2019-01-02 NOTE — HOSPPROG
Hospitalist Progress Note


Assessment/Plan: 


58-year-old gentleman with a history of alcohol and heroin dependence last with 

last use 5 days ago, bipolar disorder with PTSD who presents to the emergency 

department with sudden onset of epigastric/right upper quadrant abdominal pain. 





*  Acute Sepsis with increased fevers and hypotension due to biliary disease


-resolved


-cont dapto for VRE through January 3. 


   


*  Vancomycin Resistant Enterococcal Bacteremia with growth in all bottles; 

biliary source


-daptomycin: CPK stable


-here in the hospital till January 3


   


*  Retained CBD stone  - had isac, discussed case with surgery who is fine 

with discharge at this point. 





*  cholecystitis noted on CT scan- post op isac, tolerating PO. fine to 

discharge from surgeons standpoint. 





*  hypoxemic resp failure, pulmonary edema by cxr but with normal echo


-resolved w diuresis


   


*  Toxic/metabolic encephalopathy due to anesthesia + infection, IVDA


-resolved


   


*  h/o TBI with chronic cognitive dysfunction





*  history of Heroin abuse 


-Suboxone (unclear if opiates on drug screen in ER due to Suboxone or if he has 

used heroin recently)





*  Methadone use in the outpatient setting, along with marijuana





*  Etoh abuse


-no s/sx of withdrawal


   


*  Bipolar/PTSD/chronic anxiety


-seroquel + trazodone, p.r.n. Ativan





*  Tobacco dependence, 


   -patch





*  Hep C


-HIV negative


-has evidence of prior exposure to hepatitis A and B, so vaccines not indicated





* chest pain


-none further





*elevated TSH


-recheck in OP setting, not significantly high





Plan to continue dapto until January 3rd. will clear with ID tomorrow, then 

pull picc and dc if they are okay wtih plan





Subjective: patient with mild ab tenderness. tolerating PO, no bm but passing 

flatus


Objective: 


 Vital Signs











Temp Pulse Resp BP Pulse Ox


 


 36.6 C   74   19   93/58 L  90 L


 


 01/02/19 12:00  01/02/19 12:00  01/02/19 12:00  01/02/19 12:00  01/02/19 12:00








 Laboratory Results





 12/28/18 04:54 





 12/28/18 13:15 





 











 01/01/19 01/02/19 01/03/19





 05:59 05:59 05:59


 


Intake Total  1450 


 


Balance  1450 








 











PT  12.4 SEC (12.0-15.0)   12/20/18  05:35    


 


INR  0.90  (0.83-1.16)   12/20/18  05:35    














- Physical Exam


Constitutional: no apparent distress, appears nourished, not in pain


Eyes: PERRL, anicteric sclera, EOMI


Ears, Nose, Mouth, Throat: moist mucous membranes, hearing normal, ears appear 

normal, no oral mucosal ulcers


Cardiovascular: regular rate and rhythym, no murmur, rub, or gallop


Respiratory: no respiratory distress, no rales or rhonchi, clear to auscultation


Gastrointestinal: normoactive bowel sounds, soft, non-tender abdomen, no 

palpable masses


Genitourinary: no bladder fullness, no bladder tenderness, no renal bruits


Skin: no rashes or abrasions, no fluctuance, no induration


Musculoskeletal: full muscle strength, no muscle tenderness, normal joint ROM


Neurologic: AAOx3, sensation intact bilaterally


Psychiatric: interacting appropriately, not anxious, not encephalopathic, 

thought process linear


Lymph, Heme, Immunologic: no cervical LAD, no supraclavicular LAD





ICD10 Worksheet


Patient Problems: 


 Problems











Problem Status Onset


 


Common bile duct mass Acute  


 


Alcohol dependence Acute  


 


Alcoholic intoxication Acute  


 


Dehydration Acute  


 


Delirium tremens Acute  


 


Head injury Acute  


 


Heroin abuse Acute  


 


Hypokalemia Acute  


 


Hypomagnesemia Acute  


 


Pneumonia Acute  


 


Respiratory failure Acute  


 


Seizure Acute

## 2019-01-03 LAB
HCV RNA SERPL NAA DL=5-ACNC: (no result) IU/ML (ref ?–10)
PLATELET # BLD: 292 10^3/UL (ref 150–400)

## 2019-01-03 RX ADMIN — DOCUSATE SODIUM AND SENNOSIDES SCH TAB: 50; 8.6 TABLET ORAL at 20:02

## 2019-01-03 RX ADMIN — GABAPENTIN SCH MG: 300 CAPSULE ORAL at 06:00

## 2019-01-03 RX ADMIN — METHOCARBAMOL PRN MG: 750 TABLET ORAL at 20:50

## 2019-01-03 RX ADMIN — DOCUSATE SODIUM AND SENNOSIDES SCH TAB: 50; 8.6 TABLET ORAL at 08:26

## 2019-01-03 RX ADMIN — GABAPENTIN SCH MG: 300 CAPSULE ORAL at 12:47

## 2019-01-03 RX ADMIN — MULTIPLE VITAMINS W/ MINERALS TAB SCH EACH: TAB at 08:26

## 2019-01-03 RX ADMIN — DAPTOMYCIN SCH MLS: 500 INJECTION, POWDER, LYOPHILIZED, FOR SOLUTION INTRAVENOUS at 20:03

## 2019-01-03 RX ADMIN — ACETAMINOPHEN PRN MG: 325 TABLET ORAL at 18:39

## 2019-01-03 RX ADMIN — KETOROLAC TROMETHAMINE PRN MG: 30 INJECTION, SOLUTION INTRAMUSCULAR at 06:02

## 2019-01-03 RX ADMIN — ENOXAPARIN SODIUM SCH MG: 100 INJECTION SUBCUTANEOUS at 08:27

## 2019-01-03 RX ADMIN — NICOTINE SCH MG: 14 PATCH TRANSDERMAL at 08:25

## 2019-01-03 RX ADMIN — GABAPENTIN SCH MG: 300 CAPSULE ORAL at 17:41

## 2019-01-03 RX ADMIN — Medication SCH MG: at 08:26

## 2019-01-03 RX ADMIN — FOLIC ACID SCH MG: 1 TABLET ORAL at 08:27

## 2019-01-03 RX ADMIN — GABAPENTIN SCH MG: 300 CAPSULE ORAL at 20:02

## 2019-01-03 RX ADMIN — ACETAMINOPHEN PRN MG: 325 TABLET ORAL at 06:01

## 2019-01-03 NOTE — PCMIDPN
Assessment/Plan: 





# VRE sepsis secondary to cholangitis with obstructing common bile duct stone 

status post extraction December 25 and cholecystectomy 1/1/19.  Blood cx 

cleared rapidly and TTE neg


--last day high dose daptomycin today, dosed at 8:00 p.m. , Therefore will hold 

discharge till tomorrow.  Can discharge before ID rounds


--still w abdominal pain RUQ, seems chronic since surgery and dc narcotics. 

Labs normal and AF. Discussed w hospitalist


--remove PICC before dc





# Hepatitis-C antibody positive: VL pending; HIV negative








Meds


Daptomycin 580mg IV daily, # 10





Microbiology


12/25/18 11:00   Blood   Cx (2) Neg


12/23/18 06:10   Blood   Cx 2/2: Enterococcus Faecium Vre


 


Subjective: 





still w abdominal pain, localizing to the right upper quadrant


appetite okay


Patient is having bowel movements, no diarrhea





Objective: 


 Vital Signs











Temp Pulse Resp BP Pulse Ox


 


 36.4 C   65   16   154/88 H  90 L


 


 01/03/19 07:55  01/03/19 07:55  01/03/19 07:55  01/03/19 07:55  01/03/19 07:55








 Laboratory Results





 01/03/19 08:30 





 01/03/19 08:30 





 











 01/02/19 01/03/19 01/04/19





 05:59 05:59 05:59


 


Intake Total 1450 750 


 


Balance 1450 750 














- Physical Exam


General Appearance: alert, no apparent distress, thin


EENT: poor dentition, No thrush


Respiratory: lungs clear, No accessory muscle use


Cardiac/Chest: regular rate, rhythm, No systolic murmur


Extremities: No pedal edema


Abdomen: other (soft, mild discomfort to palpation RUQ, 3 small surgical 

incision w ecchymosis; no peritoneal signs; bowel sounds present)


Male Genitalia: No hicks


Skin: pallor, No rash


Neuro/Psych: alert





- Line/s


  ** RUE PICC


Lines: No drainage, No erythema





- Time Spent With Patient


Time Spent with Patient: greater than 35 minutes


Time Spent with Patient: Greater than 35 minutes spent on this patients care, 

greater than 50% of time spent counseling, educating, and coordinating care 

regarding the above mentioned plan.





ICD10 Worksheet


Patient Problems: 


 Problems











Problem Status Onset


 


Common bile duct mass Acute  


 


Alcohol dependence Acute  


 


Alcoholic intoxication Acute  


 


Dehydration Acute  


 


Delirium tremens Acute  


 


Head injury Acute  


 


Heroin abuse Acute  


 


Hypokalemia Acute  


 


Hypomagnesemia Acute  


 


Pneumonia Acute  


 


Respiratory failure Acute  


 


Seizure Acute

## 2019-01-03 NOTE — HOSPPROG
Hospitalist Progress Note


Assessment/Plan: 


58-year-old gentleman with a history of alcohol and heroin dependence last with 

last use 5 days ago, bipolar disorder with PTSD who presents to the emergency 

department with sudden onset of epigastric/right upper quadrant abdominal pain. 





*  Acute Sepsis with increased fevers and hypotension due to biliary disease


-resolved


-cont dapto for VRE through January 3. I discussed case with ID, he will get 

last dose of dapto tonight, then in am pull picc and can discharge. 


   


*  Vancomycin Resistant Enterococcal Bacteremia with growth in all bottles; 

biliary source


-daptomycin: CPK stable


-here in the hospital till January 3


   


*  Retained CBD stone  - had isac, discussed case with surgery today who is 

fine with discharge at this point. 





*  cholecystitis noted on CT scan- post op isac, tolerating PO. fine to 

discharge from surgeons standpoint. 





*  hypoxemic resp failure, pulmonary edema by cxr but with normal echo


-resolved w diuresis


   


*  Toxic/metabolic encephalopathy due to anesthesia + infection, IVDA


-resolved


   


*  h/o TBI with chronic cognitive dysfunction





*  history of Heroin abuse 


-Suboxone (unclear if opiates on drug screen in ER due to Suboxone or if he has 

used heroin recently)





*  Methadone use in the outpatient setting, along with marijuana





*  Etoh abuse


-no s/sx of withdrawal


   


*  Bipolar/PTSD/chronic anxiety


-seroquel + trazodone, p.r.n. Ativan





*  Tobacco dependence, 


   -patch





*  Hep C


-Hep C RNA positive. will discuss with patient. LFTs WNL currently. 





* chest pain


-none further





*elevated TSH


-recheck in OP setting, not significantly high





Plan to continue dapto until January 3rd. will clear with ID tomorrow, then 

pull picc and dc if they are okay wtih plan





Subjective: still with ab tenderness. tolerating pO well. passing flatus.


Objective: 


 Vital Signs











Temp Pulse Resp BP Pulse Ox


 


 36.4 C   65   16   154/88 H  90 L


 


 01/03/19 07:55  01/03/19 07:55  01/03/19 07:55  01/03/19 07:55  01/03/19 07:55








 Laboratory Results





 01/03/19 08:30 





 01/03/19 08:30 





 











 01/02/19 01/03/19 01/04/19





 05:59 05:59 05:59


 


Intake Total 1450 750 


 


Balance 1450 750 








 











PT  12.4 SEC (12.0-15.0)   12/20/18  05:35    


 


INR  0.90  (0.83-1.16)   12/20/18  05:35    














- Physical Exam


Constitutional: no apparent distress, appears nourished, not in pain


Eyes: PERRL, anicteric sclera, EOMI


Ears, Nose, Mouth, Throat: moist mucous membranes, hearing normal, ears appear 

normal, no oral mucosal ulcers


Cardiovascular: regular rate and rhythym, no murmur, rub, or gallop


Respiratory: no respiratory distress, no rales or rhonchi, clear to auscultation


Gastrointestinal: normoactive bowel sounds, soft, non-tender abdomen, no 

palpable masses


Genitourinary: no bladder fullness, no bladder tenderness, no renal bruits


Skin: no rashes or abrasions, no fluctuance, no induration


Musculoskeletal: full muscle strength, no muscle tenderness, normal joint ROM


Neurologic: AAOx3, sensation intact bilaterally


Psychiatric: interacting appropriately, not anxious, not encephalopathic, 

thought process linear


Lymph, Heme, Immunologic: no cervical LAD, no supraclavicular LAD





ICD10 Worksheet


Patient Problems: 


 Problems











Problem Status Onset


 


Common bile duct mass Acute  


 


Alcohol dependence Acute  


 


Alcoholic intoxication Acute  


 


Dehydration Acute  


 


Delirium tremens Acute  


 


Head injury Acute  


 


Heroin abuse Acute  


 


Hypokalemia Acute  


 


Hypomagnesemia Acute  


 


Pneumonia Acute  


 


Respiratory failure Acute  


 


Seizure Acute

## 2019-01-04 VITALS — DIASTOLIC BLOOD PRESSURE: 88 MMHG | SYSTOLIC BLOOD PRESSURE: 137 MMHG

## 2019-01-04 PROCEDURE — 0FC98ZZ EXTIRPATION OF MATTER FROM COMMON BILE DUCT, VIA NATURAL OR ARTIFICIAL OPENING ENDOSCOPIC: ICD-10-PCS | Performed by: INTERNAL MEDICINE

## 2019-01-04 RX ADMIN — ENOXAPARIN SODIUM SCH MG: 100 INJECTION SUBCUTANEOUS at 09:25

## 2019-01-04 RX ADMIN — NICOTINE SCH MG: 14 PATCH TRANSDERMAL at 09:23

## 2019-01-04 RX ADMIN — DOCUSATE SODIUM AND SENNOSIDES SCH TAB: 50; 8.6 TABLET ORAL at 09:26

## 2019-01-04 RX ADMIN — FOLIC ACID SCH MG: 1 TABLET ORAL at 09:25

## 2019-01-04 RX ADMIN — MULTIPLE VITAMINS W/ MINERALS TAB SCH EACH: TAB at 09:25

## 2019-01-04 RX ADMIN — METHOCARBAMOL PRN MG: 750 TABLET ORAL at 05:01

## 2019-01-04 RX ADMIN — ACETAMINOPHEN PRN MG: 325 TABLET ORAL at 04:59

## 2019-01-04 RX ADMIN — GABAPENTIN SCH MG: 300 CAPSULE ORAL at 05:01

## 2019-01-04 RX ADMIN — Medication SCH MG: at 09:24

## 2019-01-04 NOTE — ASMTCMCOM
CM Note

 

CM Note                       

Notes:

Pt medically stable for d/c to [facility], shelter bed reserved. Pt scheduled for 

[facility] appointment 01/10/19 at 10:30. Pt provided [facility] transport. 

 

Date Signed:  01/04/2019 10:58 AM

Electronically Signed By:VENESSA Kay

## 2019-01-04 NOTE — GIREPORT
[facility]

Surgical Services - Endoscopy Department

_____________________________________________________________________________________________________
_______

Patient Name: Tomás Quiroga                         Procedure Date: 12/22/2018 11:01 AM

MRN: [id number]                                       Account Number: [id number]

Patient Type: Inpatient                              Attending  MD/ ER Physician: Leandro Jacobson MD

_____________________________________________________________________________________________________
_______

 

Procedure:                    ERCP

Indications:                  Suspected bile duct stone, on I.R. cholangiogram

Providers:                    [facility]son [facility], [facility]

Referring MD:                 [facility] Hospitalist service

Medicines:                    Indomethacin 100 mg [place], See the Anesthesia note for documentation of th
e 

                              administered medications

Complications:                No immediate complications.

Description of Procedure:     After obtaining informed consent, the scope was passed under direct vis
ion. 

                              Throughout the procedure, the patient's blood pressure, pulse, and oxyg
en 

                              saturations were monitored continuously. The Duodenal scope was introdu
christiano 

                              through the mouth, and advanced to the duodenum and used to inject cont
rast 

                              into the bile duct.

Findings:                     I.R. percutaneously placed guidewire could be seen, exiting the papilla
, 

                              with the distal end exiting through the duodenum wall on the other side
. The 

                              wire was pulled back, with the distal tip now in the duodenum. This was
 

                              grasped with a snare, and pulled through the 'scope. Then, a sphinctero
tome 

                              was "back threaded" over this wire. Cholangiogram showed a 1 cm stone. 
A 9 

                              mm biliary sphincterotomy was made with a sphincterotome. There was no 


                              post-sphincterotomy bleeding. Stone was removed, using a 13.5 mm balloo
n. 

                              Post balloon-occlusive cholangiogram normal. EGD was performed, to inde
ed 

                              visualize the stone fragments in the duodenum; the I.R. wire was then 

                              removed, and the percutaneous site cleaned with alcohol, topical 'biot 


                              placed, and dressed.

Estimated Blood Loss:         none.

Post Op Diagnosis:            - cbd stone, as above; removed, via a rendezvous procedure. I.R. placed
 

                              guidewire removed.

Recommendation:               - feed

                              - buffcap IV

                              - biot x 1

                              - recommend lap isac; as per hospitalist service

                              I will sign off; we'll arrange repeat LFTs in about 3 weeks, to make javed
re 

                              they normalize (as I suspect). Else, [facility] 

                              [facility] [phone]

                              Thanks!

Attending Participation:

     I personally performed the entire procedure.

 

Indira Reevse MD

______________

Leandro Jacobson MD

12/22/2018 1:28:48 PM

This report has been signed electronicallyPeter MD Indira

Number of Addenda: 0

 

Note Initiated On: 12/22/2018 11:01 AM

Total Procedure Duration Time 2 hours 37 minutes 25 seconds 

[email or web address]

## 2019-01-05 ENCOUNTER — HOSPITAL ENCOUNTER (EMERGENCY)
Dept: HOSPITAL 80 - FED | Age: 59
Discharge: HOME | End: 2019-01-05
Payer: MEDICAID

## 2019-01-05 VITALS — SYSTOLIC BLOOD PRESSURE: 132 MMHG | DIASTOLIC BLOOD PRESSURE: 98 MMHG

## 2019-01-05 VITALS — DIASTOLIC BLOOD PRESSURE: 95 MMHG | SYSTOLIC BLOOD PRESSURE: 157 MMHG

## 2019-01-05 DIAGNOSIS — Z90.49: ICD-10-CM

## 2019-01-05 DIAGNOSIS — E86.9: ICD-10-CM

## 2019-01-05 DIAGNOSIS — F15.20: ICD-10-CM

## 2019-01-05 DIAGNOSIS — Z59.0: ICD-10-CM

## 2019-01-05 DIAGNOSIS — R10.9: Primary | ICD-10-CM

## 2019-01-05 DIAGNOSIS — J96.90: ICD-10-CM

## 2019-01-05 DIAGNOSIS — F43.10: ICD-10-CM

## 2019-01-05 DIAGNOSIS — J98.4: Primary | ICD-10-CM

## 2019-01-05 DIAGNOSIS — F10.10: ICD-10-CM

## 2019-01-05 LAB
INR PPP: 1.03 (ref 0.83–1.16)
INR PPP: 1.04 (ref 0.83–1.16)
PLATELET # BLD: 316 10^3/UL (ref 150–400)
PLATELET # BLD: 324 10^3/UL (ref 150–400)
PROTHROMBIN TIME: 13.7 SEC (ref 12–15)
PROTHROMBIN TIME: 13.8 SEC (ref 12–15)

## 2019-01-05 PROCEDURE — G0480 DRUG TEST DEF 1-7 CLASSES: HCPCS

## 2019-01-05 SDOH — ECONOMIC STABILITY - HOUSING INSECURITY: HOMELESSNESS: Z59.0

## 2019-01-05 NOTE — CPEKG
Test Reason : OPEN

Blood Pressure : ***/*** mmHG

Vent. Rate : 091 BPM     Atrial Rate : 094 BPM

   P-R Int : 123 ms          QRS Dur : 099 ms

    QT Int : 395 ms       P-R-T Axes : 070 -76 020 degrees

   QTc Int : 487 ms

 

Sinus rhythm

Ventricular premature complex

Probable left atrial enlargement

Left anterior fascicular block

Abnormal R-wave progression, early transition

 

 

Confirmed by Eddie Allen ([id number]) on 1/5/2019 9:43:25 PM

 

Referred By:             Confirmed By:Eddie Allen

## 2019-01-05 NOTE — EDPHY
H & P


Stated Complaint: abd pain to incision sites from isac


Time Seen by Provider: 01/05/19 04:49


HPI/ROS: 





HPI





CHIEF COMPLAINT:  Abdominal pain





HISTORY OF PRESENT ILLNESS:  A 58-year-old male, history of alcoholism daily 

alcohol use, respiratory failure, alcohol withdrawal, homelessness, presents 

emergency room with abdominal pain.  Patient has a history of liver disease, 

additionally recent choledocholithiasis and gallbladder surgery on January 1st.


He had a laparoscopic cholecystectomy at that time.


He presents emergency room at 5:00 a.m. In the morning from the homeless 

shelter with abdominal pain.


He has not any vomiting or diarrhea.  No fever.  Denies any chest pain or 

shortness of breath.


The patient's main complaint his abdominal pain located at his laparoscopic 

port site periumbilical.  Also complains some mild pain in the lower abdomen.  

Diffusely.  States started earlier tonight.











Past Medical History:  Alcoholism with alcohol withdrawal, respiratory failure, 

respiratory failure





Past Surgical History:  Recent gallbladder surgery.





Social History:  Alcohol use.  Homeless.





Family History:  Noncontributory








ROS   


REVIEW OF SYSTEMS:


10 Systems were reviewed and negative with the exception of the elements 

mentioned in the history of present illness.








Exam   


Constitutional thin appearing, frail  triage nursing summary reviewed, vital 

signs reviewed, awake/alert. 


Eyes   normal conjunctivae and sclera, EOMI, PERRLA. 


HENT   normal inspection, atraumatic, moist mucus membranes, no epistaxis, neck 

supple/ no meningismus, no raccoon eyes. 


Respiratory   clear to auscultation bilaterally, normal breath sounds, no 

respiratory distress, no wheezing. 


Cardiovascular   rate normal, regular rhythm, no murmur, no edema, distal 

pulses normal. 


Gastrointestinal  abdomen mildly tender but no peritoneal signs, laparoscopic 

sites are clean, dry and intact, the site where he has most pain is the 

periumbilical site I do not appreciate a seroma or hematoma or abscess, the 

incision is clean, dry and intact without any signs of infection, ecchymosis 

present to be expected, no rebound, no guarding, normal bowel sounds, no 

distension, no pulsatile mass. 


Genitourinary   no CVA tenderness. 


Musculoskeletal  no midline vertebral tenderness, full range of motion, no calf 

swelling, no tenderness of extremities, no meningismus, good pulses, 

neurovascularly intact.


Skin   pink, warm, & dry, no rash, skin atraumatic. 


Neurologic   awake, alert and oriented x 3, AAOx3, moves all 4 extremities 

equally, motor intact, sensory intact, CN II-XII intact, normal cerebellar, 

normal vision, normal speech. 


Psychiatric   normal mood/affect. 


Heme/Lymph/Immune   no lymphadenopathy.





Differential Diagnosis:  Differential diagnosis includes but is not limited to 

and in no particular order:  Bowel obstruction, appendicitis, gallbladder 

disease, diverticulitis, colitis, enteritis, perforated viscus, gastritis, GERD

, esophagitis, urinary tract infection, pyelonephritis, kidney stones





Medical Decision Making:  Plan for this patient IV establishment IV fluid bolus 

basic blood work, CT scan abdomen pelvis with IV contrast, check alcohol level.

  Will perform CT abdomen pelvis with IV contrast due to abdominal pain post 

surgery.  Patient does not have any chest pain or shortness of breath or 

pleuritic pain.  Vital signs are stable.





Re-evaluation:








CT scan abdomen pelvis with IV contrast negative for acute inflammatory 

process.  There is free air in the abdomen however he just had laparoscopic 

cholecystectomy 4 days ago.  This is not a large amount of air.  There is no 

large abscess there is no significant amount of free fluid.


Called to me by Dr. Munoz.





The patient's labs reviewed mild leukocytosis.





Patient's vital signs stable and is afebrile.


No evidence of alcohol withdrawal here.





Abdomen remained soft not significantly tender.  No peritoneal signs.





Will touch base with his surgeon Dr. Torres.





Spoke with jennifer Vasquez for d/c home. Reviewed case/labs/ct. 





Updated patient on labs and CT.





Plan for discharge.





Return precautions discussed with the patient understands return emergency room 

if develops worsening abdominal pain, fever, vomiting.


Patient is comfortable this plan.





Abdomen remained soft nontender.  Incision sites are healing.  No dehiscence 

and no infection.


Source: Patient, EMS





- Personal History


Current Tetanus/Diphtheria Vaccine: Yes


Tetanus Vaccine Date: 2015





- Medical/Surgical History


Hx Asthma: No


Hx Chronic Respiratory Disease: No


Hx Diabetes: No


Hx Cardiac Disease: No


Hx Renal Disease: No


Hx Cirrhosis: No


Hx Alcoholism: Yes


Hx HIV/AIDS: No


Hx Splenectomy or Spleen Trauma: No


Other PMH: CHI, PTSD, bipolar, ETOH, TBI. Heroin use, isac





- Social History


Smoking Status: Current every day smoker


Constitutional: 


 Initial Vital Signs











Temperature (C)  36.7 C   01/05/19 04:43


 


Heart Rate  84   01/05/19 04:43


 


Respiratory Rate  18   01/05/19 04:43


 


Blood Pressure  129/95 H  01/05/19 04:43


 


O2 Sat (%)  95   01/05/19 04:43








 











O2 Delivery Mode               Room Air














Allergies/Adverse Reactions: 


 





No Known Allergies Allergy (Verified 01/05/19 04:46)


 








Home Medications: 














 Medication  Instructions  Recorded


 


Buprenorphine HCl/Naloxone HCl 1 each SL DAILY 12/12/18





[Suboxone 8 mg-2 mg SL Film]  


 


QUEtiapine FUMARATE [Seroquel 200 200 - 400 mg PO DAILY 12/12/18





mg (*)]  


 


traZODone [traZODONE 100MG (*)] 200 mg PO HS 12/12/18


 


Gabapentin [Neurontin 300 MG (*)] 300 mg PO QID 12/17/18


 


Herbals/Supplements -Info Only 1 ea PO DAILY 12/17/18


 


Multivitamins [Multivitamin (*)] 1 each PO DAILY 12/17/18














Medical Decision Making





- Data Points


Laboratory Results: 


 Laboratory Results





 01/05/19 05:00 





 01/05/19 05:00 





 











  01/05/19 01/05/19 01/05/19





  06:05 05:00 05:00


 


WBC      14.45 10^3/uL H 10^3/uL





     (3.80-9.50) 


 


RBC      3.96 10^6/uL L 10^6/uL





     (4.40-6.38) 


 


Hgb      11.6 g/dL L g/dL





     (13.7-17.5) 


 


Hct      35.1 % L %





     (40.0-51.0) 


 


MCV      88.6 fL fL





     (81.5-99.8) 


 


MCH      29.3 pg pg





     (27.9-34.1) 


 


MCHC      33.0 g/dL g/dL





     (32.4-36.7) 


 


RDW      14.3 % %





     (11.5-15.2) 


 


Plt Count      316 10^3/uL 10^3/uL





     (150-400) 


 


MPV      9.5 fL fL





     (8.7-11.7) 


 


Neut % (Auto)      79.0 % H %





     (39.3-74.2) 


 


Lymph % (Auto)      8.2 % L %





     (15.0-45.0) 


 


Mono % (Auto)      10.0 % %





     (4.5-13.0) 


 


Eos % (Auto)      2.2 % %





     (0.6-7.6) 


 


Baso % (Auto)      0.2 % L %





     (0.3-1.7) 


 


Nucleat RBC Rel Count      0.0 % %





     (0.0-0.2) 


 


Absolute Neuts (auto)      11.42 10^3/uL H 10^3/uL





     (1.70-6.50) 


 


Absolute Lymphs (auto)      1.18 10^3/uL 10^3/uL





     (1.00-3.00) 


 


Absolute Monos (auto)      1.44 10^3/uL H 10^3/uL





     (0.30-0.80) 


 


Absolute Eos (auto)      0.32 10^3/uL 10^3/uL





     (0.03-0.40) 


 


Absolute Basos (auto)      0.03 10^3/uL 10^3/uL





     (0.02-0.10) 


 


Absolute Nucleated RBC      0.00 10^3/uL 10^3/uL





     (0-0.01) 


 


Immature Gran %      0.4 % %





     (0.0-1.1) 


 


Immature Gran #      0.06 10^3/uL 10^3/uL





     (0.00-0.10) 


 


PT      





    


 


INR      





    


 


APTT      





    


 


VBG Lactic Acid      





    


 


Sodium    134 mEq/L L mEq/L  





    (135-145)  


 


Potassium    4.3 mEq/L mEq/L  





    (3.5-5.2)  


 


Chloride    95 mEq/L L mEq/L  





    ()  


 


Carbon Dioxide    29 mEq/l mEq/l  





    (22-31)  


 


Anion Gap    10 mEq/L mEq/L  





    (6-14)  


 


BUN    13 mg/dL mg/dL  





    (7-23)  


 


Creatinine    0.7 mg/dL mg/dL  





    (0.7-1.3)  


 


Estimated GFR    > 60   





    


 


Glucose    111 mg/dL H mg/dL  





    ()  


 


Calcium    9.3 mg/dL mg/dL  





    (8.5-10.4)  


 


Total Bilirubin    0.9 mg/dL mg/dL  





    (0.1-1.4)  


 


Conjugated Bilirubin    0.4 mg/dL mg/dL  





    (0.0-0.5)  


 


Unconjugated Bilirubin    0.5 mg/dL mg/dL  





    (0.0-1.1)  


 


AST    23 IU/L IU/L  





    (17-59)  


 


ALT    20 IU/L L IU/L  





    (21-72)  


 


Alkaline Phosphatase    101 IU/L IU/L  





    ()  


 


Total Protein    7.5 g/dL g/dL  





    (6.3-8.2)  


 


Albumin    3.9 g/dL g/dL  





    (3.5-5.0)  


 


Lipase    14 IU/L L IU/L  





    ()  


 


Urine Color  YELLOW     





    


 


Urine Appearance  CLEAR     





    


 


Urine pH  8.0  H     





   (5.0-7.5)   


 


Ur Specific Gravity  1.006     





   (1.002-1.030)   


 


Urine Protein  NEGATIVE     





   (NEGATIVE)   


 


Urine Ketones  NEGATIVE     





   (NEGATIVE)   


 


Urine Blood  NEGATIVE     





   (NEGATIVE)   


 


Urine Nitrate  NEGATIVE     





   (NEGATIVE)   


 


Urine Bilirubin  NEGATIVE     





   (NEGATIVE)   


 


Urine Urobilinogen  NEGATIVE EU EU    





   (0.2-1.0)   


 


Ur Leukocyte Esterase  NEGATIVE     





   (NEGATIVE)   


 


Urine Glucose  NEGATIVE     





   (NEGATIVE)   














  01/05/19 01/05/19





  05:00 01:41


 


WBC    





   


 


RBC    





   


 


Hgb    





   


 


Hct    





   


 


MCV    





   


 


MCH    





   


 


MCHC    





   


 


RDW    





   


 


Plt Count    





   


 


MPV    





   


 


Neut % (Auto)    





   


 


Lymph % (Auto)    





   


 


Mono % (Auto)    





   


 


Eos % (Auto)    





   


 


Baso % (Auto)    





   


 


Nucleat RBC Rel Count    





   


 


Absolute Neuts (auto)    





   


 


Absolute Lymphs (auto)    





   


 


Absolute Monos (auto)    





   


 


Absolute Eos (auto)    





   


 


Absolute Basos (auto)    





   


 


Absolute Nucleated RBC    





   


 


Immature Gran %    





   


 


Immature Gran #    





   


 


PT    13.7 SEC SEC





    (12.0-15.0) 


 


INR    1.03 





    (0.83-1.16) 


 


APTT    32.5 SEC SEC





    (23.0-38.0) 


 


VBG Lactic Acid  2.0 mmol/L mmol/L  





   (0.7-2.1)  


 


Sodium    





   


 


Potassium    





   


 


Chloride    





   


 


Carbon Dioxide    





   


 


Anion Gap    





   


 


BUN    





   


 


Creatinine    





   


 


Estimated GFR    





   


 


Glucose    





   


 


Calcium    





   


 


Total Bilirubin    





   


 


Conjugated Bilirubin    





   


 


Unconjugated Bilirubin    





   


 


AST    





   


 


ALT    





   


 


Alkaline Phosphatase    





   


 


Total Protein    





   


 


Albumin    





   


 


Lipase    





   


 


Urine Color    





   


 


Urine Appearance    





   


 


Urine pH    





   


 


Ur Specific Gravity    





   


 


Urine Protein    





   


 


Urine Ketones    





   


 


Urine Blood    





   


 


Urine Nitrate    





   


 


Urine Bilirubin    





   


 


Urine Urobilinogen    





   


 


Ur Leukocyte Esterase    





   


 


Urine Glucose    





   











Medications Given: 


 








Discontinued Medications





Sodium Chloride (Ns)  1,000 mls @ 0 mls/hr IV EDNOW ONE; Wide Open


   PRN Reason: Protocol


   Stop: 01/05/19 04:47


   Last Admin: 01/05/19 05:03 Dose:  1,000 mls








Departure





- Departure


Disposition: Home, Routine, Self-Care


Clinical Impression: 


 Abdominal pain





Condition: Good


Instructions:  Acute Abdominal Pain (ED)


Additional Instructions: 


1. Take it easy.  No heavy lifting


2. No vigorous movements you're still healing


3. Tylenol Motrin for pain


4. Return to the emergency room if worsening symptoms


5. Issues developed worsening abdominal pain, fever, not doing well return


Referrals: 


Patient,NotPresent [Unknown] - As per Instructions


Ryan Torres MD [Medical Doctor] - As per Instructions

## 2019-01-05 NOTE — PDDCSUM
Discharge Summary


Discharge Summary: 





Discharge diagnoses;


Sepsis secondary to VRE.


VRE bacteremia


Choledocholithiasis


Cholecystitis


Bipolar


Hepatitis C


Suboxone use











Patient is a 58-year-old male with past medical history of heroin addiction 

current on Suboxone, PTSD, TBI, alcohol dependence who originally presented to 

the ER with complaints of abdominal pain.  Imaging was obtained and patient was 

found to biliary obstruction.  He underwent 2 ERCPs with stone extraction.  

Further evaluation found multiple common bile duct stones.  General surgery was 

consulted for evaluation of cholecystectomy.  Blood cultures were drawn that 

eventually grew out vancomycin-resistant Enterococcus.  Infectious Disease was 

consulted.  He was started on daptomycin.  Ultimately he was diagnosed with 

choledocholithiasis and underwent a laparoscopic cholecystectomy.  He completed 

his course of daptomycin on January 3rd.  On the day of discharge he was eating 

well with minimal abdominal pain and had a soft abdomen and had had a bowel 

movement.  He was homeless and so was discharged to the homeless shelter to 

follow up with surgery in clinic and to establish care with a primary care 

doctor





Disposition


Patient was discharged to the homeless shelter








Discharge condition


Good








Follow-up


Patient was discharged to follow up with his general surgeon, infectious disease

, and primary care doctor

## 2019-01-05 NOTE — EDPHY
H & P


Smoking Status: Current every day smoker


Time Seen by Provider: 01/05/19 20:02


HPI/ROS: 





Chief complaint.  Chest pain, shortness of breath, depression 





HPI.  Patient is a 58-year-old male with complicated recent medical history.  

He was admitted December 17 for common bile duct mass that turned out the 

stone.  The he had an ERCP x2 with inability to remove.  And subsequently on 

January 1st he had a cholecystectomy.  Prior to this he had ascending 

cholangitis and enterococcal bacteremia in severe sepsis with pleural 

effusions.  He was seen earlier this morning for abdominal pain and had a 

normal abdominal CT that did shows of small amount of residual free air but no 

evidence for abscess.  In consultation with the surgeon it was felt safe to 

discharge the patient.  He has been quite anxious and depressed.  He did meth 

and drink alcohol today.  He has anxiety.  He is on a warrant for trespassing.  

He denies suicide and homicide ideation but quite depressed and wanting to talk 

to counselor.  He complains left-sided chest discomfort worse with breathing.  

Some shortness of breath.





ROS


10 systems were reviewed and negative with the exception of the elements 

mentioned in the history of present illness


 (Mark Castillo)


Past Medical/Surgical History: 





Past medical history closed head injury, PTSD, bipolar illness, alcoholism, IVDA

, recent cholecystectomy (Mark Castillo)


Social History: 





Single, daily smoker, homeless, recent alcohol and meth (Mark Castillo)


Physical Exam: 





General Appearance:  Alert well-developed male mild distress vital signs are 

stable.


Eyes: Pupils equal and round no pallor or injection.


ENT, Mouth:  Mucous membranes are moist.


Respiratory:  There are no retractions, lungs are clear to auscultation.


Cardiovascular: Regular rate and rhythm.


Gastrointestinal:  Abdomen is soft and not tender.  Healing surgical incisions.

  Bruising on the abdomen from Lovenox injections


Neurological:  Awake and alert, sensory and motor exams grossly normal.


Skin: Warm and dry, no rashes.


Musculoskeletal:  Neck is supple nontender.


Extremities  symmetrical, full range of motion.


Psychiatric: Patient is oriented X 3, there is no agitation. (Mark Castillo)


Constitutional: 


 Initial Vital Signs











Temperature (C)  36.7 C   01/05/19 20:06


 


Heart Rate  98   01/05/19 20:06


 


Respiratory Rate  18   01/05/19 20:06


 


Blood Pressure  162/92 H  01/05/19 20:06


 


O2 Sat (%)  95   01/05/19 20:06








 











O2 Delivery Mode               Room Air














Allergies/Adverse Reactions: 


 





No Known Allergies Allergy (Verified 01/05/19 20:06)


 








Home Medications: 














 Medication  Instructions  Recorded


 


Buprenorphine HCl/Naloxone HCl 1 each SL DAILY 12/12/18





[Suboxone 8 mg-2 mg SL Film]  


 


QUEtiapine FUMARATE [Seroquel 200 200 - 400 mg PO DAILY 12/12/18





mg (*)]  


 


traZODone [traZODONE 100MG (*)] 200 mg PO HS 12/12/18


 


Gabapentin [Neurontin 300 MG (*)] 300 mg PO QID 12/17/18


 


Herbals/Supplements -Info Only 1 ea PO DAILY 12/17/18


 


Multivitamins [Multivitamin (*)] 1 each PO DAILY 12/17/18














Medical Decision Making





- Diagnostics


EKG Interpretation: 





An EKG obtained and was read and documented in trace view.  Please see trace 

view for full reading and report.  Sinus rhythm, PVC, no acute ischemic changes 

 (Eddie Allen)


Procedures: 





IV normal saline, monitor (Mark Castillo)


ED Course/Re-evaluation: 





Patient's care signed out to me with CTA pending.  Patient's CT is reassuring 

for no pulmonary embolism however has diffuse patchy nodular opacities 

consistent with multi lobar pneumonia verses airspace disease.  2 weeks ago on 

the 26th he had similar appearing x-ray however it had improved by the 28. This 

was during his hospital stay for ascending cholangitis and he is being treated 

with antibiotics at the time.  The patient is here now complaining of pleuritic 

chest pain.  He is not hypoxic or febrile or coughing.  He abuses 

methamphetamine and has a history of heroin abuse although he states he has not 

used IV drugs in several months.  He does however snort methamphetamine and did 

so today and this is likely the source of these opacities.  He did have a 

transthoracic echocardiogram a few weeks ago that did not show any obvious 

valvular abnormalities.  He also had HIV testing that was negative.  He is 

positive for hep C.  I spoke with Dr. Gan who agrees that the patient does 

not meet admission for criteria.  In light of his recent heavy doses of 

antibiotics this is likely from the inhalation of foreign particles versus 

possibly viral.  The patient denies history of asthma or COPD.  I encouraged 

him to discontinue methamphetamine abuse and inhalation.  He is not hypoxic or 

tachypneic.  He is afebrile.  His white blood cell count is decreasing.  He 

states that he will call his sponsor trying get back to the shelter.  I will 

discharge him at this time.  Initially he complained of some mild depression 

but is denying this now.  I will refer him to [facility] outpatient 

[facility]inic for further counseling.  He is not on a hold. (Eddie Allen)


Care Turn Over: 





care to Dr. Allen at 2100. (Mark Castillo)





- Data Points


Laboratory Results: 


 Laboratory Results





 01/05/19 20:56 





 01/05/19 20:56 








Medications Given: 


 








Discontinued Medications





Sodium Chloride (Ns)  1,000 mls @ 0 mls/hr IV ONCE ONE; Wide Open


   PRN Reason: Protocol


   Stop: 01/05/19 20:42


   Last Admin: 01/05/19 21:18 Dose:  1,000 mls


Lorazepam (Ativan)  1 mg PO EDNOW ONE


   Stop: 01/05/19 20:43


   Last Admin: 01/05/19 21:18 Dose:  1 mg





Point of Care Test Results: 


 Chemistry











  01/05/19





  21:12


 


POC Troponin I  0.00 ng/mL ng/mL





   (0.00-0.08) 














Departure





- Departure


Disposition: Home, Routine, Self-Care


Clinical Impression: 


 Methamphetamine abuse





Condition: Fair


Instructions:  Methamphetamine (By mouth)


Additional Instructions: 


Path to Home~([address]; bus: [address]). 


Referrals: 


NONE *[facility] P,. [Primary Care Provider] - As per Instructions


[facility],. [Clinic] - As per Instructions


[facility],. [Clinic] - 1 day, if not improved

## 2021-04-21 NOTE — GCON
INTENSIVIST CONSULTATION



HISTORY OF PRESENT ILLNESS:  The patient is a 58-year-old white male with a past medical history incl
uding alcoholism and alcohol dependence, heroin abuse, homelessness, bipolar disorder, posttraumatic 
stress disorder, and a history of pneumonia, hypoxemic respiratory failure, requiring intubation in O
ctober of this year.  He was initially admitted on 12/17/2018, for epigastric and right upper quadran
t pain.  He was found to have a common bile duct mass, with some evidence of biliary obstruction.  He
 was initially admitted to the floor and was subsequently transferred back to the intensive care unit
 for increased agitation and anxiety.  He is currently on a medical detainer.  Patient is still somew
hat anxious and agitated.  All history is gleaned from the medical record.



REVIEW OF SYSTEMS:  Ten-point review of systems is performed and negative, except for what is listed 
in HPI.



PAST MEDICAL HISTORY:  Again significant for polysubstance abuse, alcoholism, bipolar disorder, PTSD,
 recent history of pneumonia and acute hypoxemic respiratory failure, and homelessness.



ALLERGIES:  No known allergies to medications.



MEDICATIONS:  At home include Suboxone, Seroquel, and trazodone.



SOCIAL HISTORY:  He is a 40+ pack-year smoker and continues to smoke.  No recent alcohol use.  He is 
5 days sober.  However, he does use heroin, marijuana, and amphetamines.  He is an unemployed carpent
er.  He is homeless.



PHYSICAL EXAM:  VITAL SIGNS:  Blood pressure is 106/80, pulse 91, respiration rate 18, temperature 36
.8, oxygen saturation 96% on room air.  GENERAL:  He is a well-developed, well-nourished 58-year-old 
white male who is somewhat agitated.  HEENT:  Eyes are PERRL, EOMI.  Throat shows no erythema or tons
illar hypertrophy.  NECK:  Supple.  There is no cervical adenopathy.  HEART:  Regular rate and rhythm
, without murmurs, rubs, or gallops.  LUNGS:  Diminished breath sounds but no wheeze.  There is a pro
longation of expiratory phase.  ABDOMEN:  Soft, but right upper quadrant tenderness.  Bowel sounds ar
e present.  EXTREMITIES:  No clubbing, cyanosis, or edema.



LABORATORIES:  White count 7.7, hemoglobin 13, hematocrit 42.  Platelet count is 160.  Sodium 146, po
tassium 3.9, chloride 107, CO2 27, BUN 29, creatinine 0.9.  Glucose is 100.  AST is mildly elevated a
t 112.  ALT is 46.  Alkaline phosphatase 136.  Toxicology is non-negative for opiates, amphetamines, 
benzodiazepines, marijuana.  Hepatitis C antibody is reactive.



IMPRESSION:  

1.  Abdominal pain.  

2.  Common bile duct mass with biliary obstruction.

3.  Alcohol withdrawals with history of delirium tremens.

4.  Bipolar disorder.

5.  History of heroin abuse.

6.  Agitation and anxiety.



RECOMMENDATIONS:  

1.  Agree with continued anxiety control and sedation.

2.  MRCP pending.

3.  Anticipate ERCP soon.

4.  Adequate pain control.

5.  Continue medical detainer for now.





Job #:  [id number] Wartpeel Counseling:  I discussed with the patient the risks of Wartpeel including but not limited to erythema, scaling, itching, weeping, crusting, and pain.
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
